# Patient Record
Sex: MALE | Race: WHITE | NOT HISPANIC OR LATINO | URBAN - METROPOLITAN AREA
[De-identification: names, ages, dates, MRNs, and addresses within clinical notes are randomized per-mention and may not be internally consistent; named-entity substitution may affect disease eponyms.]

---

## 2018-01-01 ENCOUNTER — INPATIENT (INPATIENT)
Facility: HOSPITAL | Age: 68
LOS: 1 days | End: 2018-10-13
Attending: INTERNAL MEDICINE | Admitting: INTERNAL MEDICINE
Payer: MEDICARE

## 2018-01-01 VITALS — TEMPERATURE: 98 F

## 2018-01-01 LAB
ABO RH CONFIRMATION: SIGNIFICANT CHANGE UP
ALBUMIN SERPL ELPH-MCNC: 2 G/DL — LOW (ref 3.5–5.2)
ALBUMIN SERPL ELPH-MCNC: 2.5 G/DL — LOW (ref 3.5–5.2)
ALBUMIN SERPL ELPH-MCNC: 2.6 G/DL — LOW (ref 3.5–5.2)
ALBUMIN SERPL ELPH-MCNC: 3.5 G/DL — SIGNIFICANT CHANGE UP (ref 3.5–5.2)
ALBUMIN SERPL ELPH-MCNC: 3.7 G/DL — SIGNIFICANT CHANGE UP (ref 3.5–5.2)
ALBUMIN SERPL ELPH-MCNC: 3.9 G/DL — SIGNIFICANT CHANGE UP (ref 3.5–5.2)
ALP SERPL-CCNC: 158 U/L — HIGH (ref 30–115)
ALP SERPL-CCNC: 186 U/L — HIGH (ref 30–115)
ALP SERPL-CCNC: 226 U/L — HIGH (ref 30–115)
ALP SERPL-CCNC: 79 U/L — SIGNIFICANT CHANGE UP (ref 30–115)
ALP SERPL-CCNC: 84 U/L — SIGNIFICANT CHANGE UP (ref 30–115)
ALP SERPL-CCNC: 84 U/L — SIGNIFICANT CHANGE UP (ref 30–115)
ALP SERPL-CCNC: 86 U/L — SIGNIFICANT CHANGE UP (ref 30–115)
ALP SERPL-CCNC: 86 U/L — SIGNIFICANT CHANGE UP (ref 30–115)
ALT FLD-CCNC: 1188 U/L — HIGH (ref 0–41)
ALT FLD-CCNC: 121 U/L — HIGH (ref 0–41)
ALT FLD-CCNC: 13 U/L — SIGNIFICANT CHANGE UP (ref 0–41)
ALT FLD-CCNC: 1646 U/L — HIGH (ref 0–41)
ALT FLD-CCNC: 2850 U/L — HIGH (ref 0–41)
ALT FLD-CCNC: 402 U/L — HIGH (ref 0–41)
ALT FLD-CCNC: 5967 U/L — HIGH (ref 0–41)
ALT FLD-CCNC: >7000 U/L — HIGH (ref 0–41)
AMYLASE P1 CFR SERPL: 378 U/L — CRITICAL HIGH (ref 25–115)
ANION GAP SERPL CALC-SCNC: 16 MMOL/L — HIGH (ref 7–14)
ANION GAP SERPL CALC-SCNC: 20 MMOL/L — HIGH (ref 7–14)
ANION GAP SERPL CALC-SCNC: 20 MMOL/L — HIGH (ref 7–14)
ANION GAP SERPL CALC-SCNC: 22 MMOL/L — HIGH (ref 7–14)
ANION GAP SERPL CALC-SCNC: 24 MMOL/L — HIGH (ref 7–14)
ANION GAP SERPL CALC-SCNC: 30 MMOL/L — HIGH (ref 7–14)
APPEARANCE UR: CLEAR — SIGNIFICANT CHANGE UP
APTT BLD: 159.9 SEC — CRITICAL HIGH (ref 27–39.2)
APTT BLD: 168.4 SEC — CRITICAL HIGH (ref 27–39.2)
APTT BLD: 25.2 SEC — LOW (ref 27–39.2)
APTT BLD: 27.9 SEC — SIGNIFICANT CHANGE UP (ref 27–39.2)
APTT BLD: 33.7 SEC — SIGNIFICANT CHANGE UP (ref 27–39.2)
APTT BLD: 34.6 SEC — SIGNIFICANT CHANGE UP (ref 27–39.2)
APTT BLD: 38 SEC — SIGNIFICANT CHANGE UP (ref 27–39.2)
APTT BLD: 39.4 SEC — HIGH (ref 27–39.2)
APTT BLD: 40 SEC — HIGH (ref 27–39.2)
APTT BLD: 82.6 SEC — CRITICAL HIGH (ref 27–39.2)
AST SERPL-CCNC: 1038 U/L — HIGH (ref 0–41)
AST SERPL-CCNC: 137 U/L — HIGH (ref 0–41)
AST SERPL-CCNC: 1409 U/L — HIGH (ref 0–41)
AST SERPL-CCNC: 3110 U/L — HIGH (ref 0–41)
AST SERPL-CCNC: 5 U/L — SIGNIFICANT CHANGE UP (ref 0–41)
AST SERPL-CCNC: 508 U/L — HIGH (ref 0–41)
AST SERPL-CCNC: 6307 U/L — HIGH (ref 0–41)
AST SERPL-CCNC: >7000 U/L — HIGH (ref 0–41)
BASE EXCESS BLDA CALC-SCNC: -16.8 MMOL/L — LOW (ref -2–2)
BASE EXCESS BLDA CALC-SCNC: -18.7 MMOL/L — LOW (ref -2–2)
BASE EXCESS BLDA CALC-SCNC: -19.4 MMOL/L — LOW (ref -2–2)
BASE EXCESS BLDA CALC-SCNC: -2.2 MMOL/L — LOW (ref -2–2)
BASE EXCESS BLDA CALC-SCNC: -3.2 MMOL/L — LOW (ref -2–2)
BASE EXCESS BLDA CALC-SCNC: -4.2 MMOL/L — LOW (ref -2–2)
BASE EXCESS BLDA CALC-SCNC: -4.2 MMOL/L — LOW (ref -2–2)
BASOPHILS # BLD AUTO: 0.01 K/UL — SIGNIFICANT CHANGE UP (ref 0–0.2)
BASOPHILS # BLD AUTO: 0.02 K/UL — SIGNIFICANT CHANGE UP (ref 0–0.2)
BASOPHILS # BLD AUTO: 0.02 K/UL — SIGNIFICANT CHANGE UP (ref 0–0.2)
BASOPHILS # BLD AUTO: 0.03 K/UL — SIGNIFICANT CHANGE UP (ref 0–0.2)
BASOPHILS # BLD AUTO: 0.03 K/UL — SIGNIFICANT CHANGE UP (ref 0–0.2)
BASOPHILS # BLD AUTO: 0.04 K/UL — SIGNIFICANT CHANGE UP (ref 0–0.2)
BASOPHILS # BLD AUTO: 0.04 K/UL — SIGNIFICANT CHANGE UP (ref 0–0.2)
BASOPHILS # BLD AUTO: 0.05 K/UL — SIGNIFICANT CHANGE UP (ref 0–0.2)
BASOPHILS # BLD AUTO: 0.06 K/UL — SIGNIFICANT CHANGE UP (ref 0–0.2)
BASOPHILS NFR BLD AUTO: 0.1 % — SIGNIFICANT CHANGE UP (ref 0–1)
BASOPHILS NFR BLD AUTO: 0.2 % — SIGNIFICANT CHANGE UP (ref 0–1)
BASOPHILS NFR BLD AUTO: 0.3 % — SIGNIFICANT CHANGE UP (ref 0–1)
BILIRUB DIRECT SERPL-MCNC: 0.9 MG/DL — HIGH (ref 0–0.2)
BILIRUB INDIRECT FLD-MCNC: 0.4 MG/DL — SIGNIFICANT CHANGE UP (ref 0.2–1.2)
BILIRUB SERPL-MCNC: 0.3 MG/DL — SIGNIFICANT CHANGE UP (ref 0.2–1.2)
BILIRUB SERPL-MCNC: 0.8 MG/DL — SIGNIFICANT CHANGE UP (ref 0.2–1.2)
BILIRUB SERPL-MCNC: 0.9 MG/DL — SIGNIFICANT CHANGE UP (ref 0.2–1.2)
BILIRUB SERPL-MCNC: 1 MG/DL — SIGNIFICANT CHANGE UP (ref 0.2–1.2)
BILIRUB SERPL-MCNC: 1.2 MG/DL — SIGNIFICANT CHANGE UP (ref 0.2–1.2)
BILIRUB SERPL-MCNC: 1.3 MG/DL — HIGH (ref 0.2–1.2)
BILIRUB SERPL-MCNC: 1.4 MG/DL — HIGH (ref 0.2–1.2)
BILIRUB UR-MCNC: NEGATIVE — SIGNIFICANT CHANGE UP
BLD GP AB SCN SERPL QL: SIGNIFICANT CHANGE UP
BUN SERPL-MCNC: 14 MG/DL — SIGNIFICANT CHANGE UP (ref 10–20)
BUN SERPL-MCNC: 22 MG/DL — HIGH (ref 10–20)
BUN SERPL-MCNC: 23 MG/DL — HIGH (ref 10–20)
BUN SERPL-MCNC: 27 MG/DL — HIGH (ref 10–20)
BUN SERPL-MCNC: 27 MG/DL — HIGH (ref 10–20)
BUN SERPL-MCNC: 29 MG/DL — HIGH (ref 10–20)
BUN SERPL-MCNC: 30 MG/DL — HIGH (ref 10–20)
BUN SERPL-MCNC: 31 MG/DL — HIGH (ref 10–20)
CALCIUM SERPL-MCNC: 6.5 MG/DL — LOW (ref 8.5–10.1)
CALCIUM SERPL-MCNC: 6.8 MG/DL — LOW (ref 8.5–10.1)
CALCIUM SERPL-MCNC: 7.3 MG/DL — LOW (ref 8.5–10.1)
CALCIUM SERPL-MCNC: 8.4 MG/DL — LOW (ref 8.5–10.1)
CALCIUM SERPL-MCNC: 8.7 MG/DL — SIGNIFICANT CHANGE UP (ref 8.5–10.1)
CALCIUM SERPL-MCNC: 8.8 MG/DL — SIGNIFICANT CHANGE UP (ref 8.5–10.1)
CALCIUM SERPL-MCNC: 8.9 MG/DL — SIGNIFICANT CHANGE UP (ref 8.5–10.1)
CALCIUM SERPL-MCNC: 9.8 MG/DL — SIGNIFICANT CHANGE UP (ref 8.5–10.1)
CHLORIDE SERPL-SCNC: 101 MMOL/L — SIGNIFICANT CHANGE UP (ref 98–110)
CHLORIDE SERPL-SCNC: 83 MMOL/L — LOW (ref 98–110)
CHLORIDE SERPL-SCNC: 91 MMOL/L — LOW (ref 98–110)
CHLORIDE SERPL-SCNC: 93 MMOL/L — LOW (ref 98–110)
CHLORIDE SERPL-SCNC: 93 MMOL/L — LOW (ref 98–110)
CHLORIDE SERPL-SCNC: 95 MMOL/L — LOW (ref 98–110)
CK MB CFR SERPL CALC: 45.1 NG/ML — HIGH (ref 0.6–6.3)
CK MB CFR SERPL CALC: 49.2 NG/ML — HIGH (ref 0.6–6.3)
CK MB CFR SERPL CALC: 51.8 NG/ML — HIGH (ref 0.6–6.3)
CK MB CFR SERPL CALC: 52.1 NG/ML — HIGH (ref 0.6–6.3)
CK MB CFR SERPL CALC: 61.3 NG/ML — HIGH (ref 0.6–6.3)
CK MB CFR SERPL CALC: 64.4 NG/ML — HIGH (ref 0.6–6.3)
CK SERPL-CCNC: 1304 U/L — HIGH (ref 0–225)
CK SERPL-CCNC: 1585 U/L — HIGH (ref 0–225)
CK SERPL-CCNC: 1657 U/L — HIGH (ref 0–225)
CK SERPL-CCNC: 1723 U/L — HIGH (ref 0–225)
CK SERPL-CCNC: 1872 U/L — HIGH (ref 0–225)
CK SERPL-CCNC: 2124 U/L — HIGH (ref 0–225)
CK SERPL-CCNC: 443 U/L — HIGH (ref 0–225)
CO2 SERPL-SCNC: 11 MMOL/L — LOW (ref 17–32)
CO2 SERPL-SCNC: 12 MMOL/L — LOW (ref 17–32)
CO2 SERPL-SCNC: 15 MMOL/L — LOW (ref 17–32)
CO2 SERPL-SCNC: 18 MMOL/L — SIGNIFICANT CHANGE UP (ref 17–32)
CO2 SERPL-SCNC: 19 MMOL/L — SIGNIFICANT CHANGE UP (ref 17–32)
CO2 SERPL-SCNC: 24 MMOL/L — SIGNIFICANT CHANGE UP (ref 17–32)
CO2 SERPL-SCNC: 25 MMOL/L — SIGNIFICANT CHANGE UP (ref 17–32)
CO2 SERPL-SCNC: 9 MMOL/L — CRITICAL LOW (ref 17–32)
COLOR SPEC: SIGNIFICANT CHANGE UP
CREAT SERPL-MCNC: 1.3 MG/DL — SIGNIFICANT CHANGE UP (ref 0.7–1.5)
CREAT SERPL-MCNC: 1.9 MG/DL — HIGH (ref 0.7–1.5)
CREAT SERPL-MCNC: 2.4 MG/DL — HIGH (ref 0.7–1.5)
CREAT SERPL-MCNC: 2.6 MG/DL — HIGH (ref 0.7–1.5)
CREAT SERPL-MCNC: 2.6 MG/DL — HIGH (ref 0.7–1.5)
CREAT SERPL-MCNC: 2.7 MG/DL — HIGH (ref 0.7–1.5)
CREAT SERPL-MCNC: 2.8 MG/DL — HIGH (ref 0.7–1.5)
CREAT SERPL-MCNC: 2.9 MG/DL — HIGH (ref 0.7–1.5)
CULTURE RESULTS: NO GROWTH — SIGNIFICANT CHANGE UP
D DIMER BLD IA.RAPID-MCNC: SIGNIFICANT CHANGE UP NG/ML DDU (ref 0–230)
D DIMER BLD IA.RAPID-MCNC: SIGNIFICANT CHANGE UP NG/ML DDU (ref 0–230)
DIFF PNL FLD: NEGATIVE — SIGNIFICANT CHANGE UP
EOSINOPHIL # BLD AUTO: 0 K/UL — SIGNIFICANT CHANGE UP (ref 0–0.7)
EOSINOPHIL # BLD AUTO: 0.01 K/UL — SIGNIFICANT CHANGE UP (ref 0–0.7)
EOSINOPHIL # BLD AUTO: 0.02 K/UL — SIGNIFICANT CHANGE UP (ref 0–0.7)
EOSINOPHIL NFR BLD AUTO: 0 % — SIGNIFICANT CHANGE UP (ref 0–8)
EOSINOPHIL NFR BLD AUTO: 0.1 % — SIGNIFICANT CHANGE UP (ref 0–8)
EOSINOPHIL NFR BLD AUTO: 0.2 % — SIGNIFICANT CHANGE UP (ref 0–8)
FIBRINOGEN PPP-MCNC: 230 MG/DL — SIGNIFICANT CHANGE UP (ref 204.4–570.6)
FIBRINOGEN PPP-MCNC: 266 MG/DL — SIGNIFICANT CHANGE UP (ref 204.4–570.6)
FIBRINOGEN PPP-MCNC: 283 MG/DL — SIGNIFICANT CHANGE UP (ref 204.4–570.6)
FIBRINOGEN PPP-MCNC: 336 MG/DL — SIGNIFICANT CHANGE UP (ref 204.4–570.6)
GAS PNL BLDA: SIGNIFICANT CHANGE UP
GLUCOSE BLDC GLUCOMTR-MCNC: 130 MG/DL — HIGH (ref 70–99)
GLUCOSE BLDC GLUCOMTR-MCNC: 166 MG/DL — HIGH (ref 70–99)
GLUCOSE BLDC GLUCOMTR-MCNC: 48 MG/DL — LOW (ref 70–99)
GLUCOSE BLDC GLUCOMTR-MCNC: 94 MG/DL — SIGNIFICANT CHANGE UP (ref 70–99)
GLUCOSE SERPL-MCNC: 111 MG/DL — HIGH (ref 70–99)
GLUCOSE SERPL-MCNC: 121 MG/DL — HIGH (ref 70–99)
GLUCOSE SERPL-MCNC: 128 MG/DL — HIGH (ref 70–99)
GLUCOSE SERPL-MCNC: 136 MG/DL — HIGH (ref 70–99)
GLUCOSE SERPL-MCNC: 286 MG/DL — HIGH (ref 70–99)
GLUCOSE SERPL-MCNC: 63 MG/DL — LOW (ref 70–99)
GLUCOSE SERPL-MCNC: 91 MG/DL — SIGNIFICANT CHANGE UP (ref 70–99)
GLUCOSE SERPL-MCNC: 99 MG/DL — SIGNIFICANT CHANGE UP (ref 70–99)
GLUCOSE UR QL: NEGATIVE — SIGNIFICANT CHANGE UP
HCO3 BLDA-SCNC: 10 MMOL/L — LOW (ref 23–27)
HCO3 BLDA-SCNC: 11 MMOL/L — LOW (ref 23–27)
HCO3 BLDA-SCNC: 11 MMOL/L — LOW (ref 23–27)
HCO3 BLDA-SCNC: 21 MMOL/L — LOW (ref 23–27)
HCO3 BLDA-SCNC: 22 MMOL/L — LOW (ref 23–27)
HCO3 BLDA-SCNC: 24 MMOL/L — SIGNIFICANT CHANGE UP (ref 23–27)
HCO3 BLDA-SCNC: 25 MMOL/L — SIGNIFICANT CHANGE UP (ref 23–27)
HCT VFR BLD CALC: 21.8 % — LOW (ref 42–52)
HCT VFR BLD CALC: 22.1 % — LOW (ref 42–52)
HCT VFR BLD CALC: 25.3 % — LOW (ref 42–52)
HCT VFR BLD CALC: 27.1 % — LOW (ref 42–52)
HCT VFR BLD CALC: 28.7 % — LOW (ref 42–52)
HCT VFR BLD CALC: 28.7 % — LOW (ref 42–52)
HCT VFR BLD CALC: 29.1 % — LOW (ref 42–52)
HCT VFR BLD CALC: 29.2 % — LOW (ref 42–52)
HCT VFR BLD CALC: 30.1 % — LOW (ref 42–52)
HCT VFR BLD CALC: 30.3 % — LOW (ref 42–52)
HCT VFR BLD CALC: 40.9 % — LOW (ref 42–52)
HGB BLD-MCNC: 10.3 G/DL — LOW (ref 14–18)
HGB BLD-MCNC: 10.4 G/DL — LOW (ref 14–18)
HGB BLD-MCNC: 14.2 G/DL — SIGNIFICANT CHANGE UP (ref 14–18)
HGB BLD-MCNC: 6.7 G/DL — CRITICAL LOW (ref 14–18)
HGB BLD-MCNC: 6.8 G/DL — CRITICAL LOW (ref 14–18)
HGB BLD-MCNC: 8.3 G/DL — LOW (ref 14–18)
HGB BLD-MCNC: 8.6 G/DL — LOW (ref 14–18)
HGB BLD-MCNC: 9.8 G/DL — LOW (ref 14–18)
HGB BLD-MCNC: 9.9 G/DL — LOW (ref 14–18)
HOROWITZ INDEX BLDA+IHG-RTO: 100 — SIGNIFICANT CHANGE UP
HOROWITZ INDEX BLDA+IHG-RTO: 40 — SIGNIFICANT CHANGE UP
HOROWITZ INDEX BLDA+IHG-RTO: 40 — SIGNIFICANT CHANGE UP
IMM GRANULOCYTES NFR BLD AUTO: 0.8 % — HIGH (ref 0.1–0.3)
IMM GRANULOCYTES NFR BLD AUTO: 1 % — HIGH (ref 0.1–0.3)
IMM GRANULOCYTES NFR BLD AUTO: 1 % — HIGH (ref 0.1–0.3)
IMM GRANULOCYTES NFR BLD AUTO: 1.3 % — HIGH (ref 0.1–0.3)
IMM GRANULOCYTES NFR BLD AUTO: 1.8 % — HIGH (ref 0.1–0.3)
IMM GRANULOCYTES NFR BLD AUTO: 2.2 % — HIGH (ref 0.1–0.3)
IMM GRANULOCYTES NFR BLD AUTO: 2.5 % — HIGH (ref 0.1–0.3)
IMM GRANULOCYTES NFR BLD AUTO: 4.3 % — HIGH (ref 0.1–0.3)
IMM GRANULOCYTES NFR BLD AUTO: 5.6 % — HIGH (ref 0.1–0.3)
INR BLD: 1.22 RATIO — SIGNIFICANT CHANGE UP (ref 0.65–1.3)
INR BLD: 1.6 RATIO — HIGH (ref 0.65–1.3)
INR BLD: 1.61 RATIO — HIGH (ref 0.65–1.3)
INR BLD: 1.84 RATIO — HIGH (ref 0.65–1.3)
INR BLD: 1.9 RATIO — HIGH (ref 0.65–1.3)
INR BLD: 1.91 RATIO — HIGH (ref 0.65–1.3)
INR BLD: 2.77 RATIO — HIGH (ref 0.65–1.3)
INR BLD: 3.27 RATIO — HIGH (ref 0.65–1.3)
INR BLD: 4.47 RATIO — HIGH (ref 0.65–1.3)
INR BLD: 5.47 RATIO — CRITICAL HIGH (ref 0.65–1.3)
KETONES UR-MCNC: NEGATIVE — SIGNIFICANT CHANGE UP
LACTATE SERPL-SCNC: 18.1 MMOL/L — CRITICAL HIGH (ref 0.5–2.2)
LACTATE SERPL-SCNC: 5.8 MMOL/L — CRITICAL HIGH (ref 0.5–2.2)
LACTATE SERPL-SCNC: 6.3 MMOL/L — CRITICAL HIGH (ref 0.5–2.2)
LEUKOCYTE ESTERASE UR-ACNC: ABNORMAL
LIDOCAIN IGE QN: 133 U/L — HIGH (ref 7–60)
LIDOCAIN IGE QN: 90 U/L — HIGH (ref 7–60)
LYMPHOCYTES # BLD AUTO: 0.77 K/UL — LOW (ref 1.2–3.4)
LYMPHOCYTES # BLD AUTO: 1.05 K/UL — LOW (ref 1.2–3.4)
LYMPHOCYTES # BLD AUTO: 1.26 K/UL — SIGNIFICANT CHANGE UP (ref 1.2–3.4)
LYMPHOCYTES # BLD AUTO: 1.41 K/UL — SIGNIFICANT CHANGE UP (ref 1.2–3.4)
LYMPHOCYTES # BLD AUTO: 1.45 K/UL — SIGNIFICANT CHANGE UP (ref 1.2–3.4)
LYMPHOCYTES # BLD AUTO: 1.49 K/UL — SIGNIFICANT CHANGE UP (ref 1.2–3.4)
LYMPHOCYTES # BLD AUTO: 1.51 K/UL — SIGNIFICANT CHANGE UP (ref 1.2–3.4)
LYMPHOCYTES # BLD AUTO: 1.55 K/UL — SIGNIFICANT CHANGE UP (ref 1.2–3.4)
LYMPHOCYTES # BLD AUTO: 1.59 K/UL — SIGNIFICANT CHANGE UP (ref 1.2–3.4)
LYMPHOCYTES # BLD AUTO: 13.7 % — LOW (ref 20.5–51.1)
LYMPHOCYTES # BLD AUTO: 13.8 % — LOW (ref 20.5–51.1)
LYMPHOCYTES # BLD AUTO: 2.4 % — LOW (ref 20.5–51.1)
LYMPHOCYTES # BLD AUTO: 3.2 % — LOW (ref 20.5–51.1)
LYMPHOCYTES # BLD AUTO: 5.7 % — LOW (ref 20.5–51.1)
LYMPHOCYTES # BLD AUTO: 5.9 % — LOW (ref 20.5–51.1)
LYMPHOCYTES # BLD AUTO: 5.9 % — LOW (ref 20.5–51.1)
LYMPHOCYTES # BLD AUTO: 8 % — LOW (ref 20.5–51.1)
LYMPHOCYTES # BLD AUTO: 9.7 % — LOW (ref 20.5–51.1)
MAGNESIUM SERPL-MCNC: 1.9 MG/DL — SIGNIFICANT CHANGE UP (ref 1.8–2.4)
MAGNESIUM SERPL-MCNC: 2 MG/DL — SIGNIFICANT CHANGE UP (ref 1.8–2.4)
MAGNESIUM SERPL-MCNC: 2.1 MG/DL — SIGNIFICANT CHANGE UP (ref 1.8–2.4)
MAGNESIUM SERPL-MCNC: 2.1 MG/DL — SIGNIFICANT CHANGE UP (ref 1.8–2.4)
MAGNESIUM SERPL-MCNC: 2.3 MG/DL — SIGNIFICANT CHANGE UP (ref 1.8–2.4)
MCHC RBC-ENTMCNC: 30.3 G/DL — LOW (ref 32–37)
MCHC RBC-ENTMCNC: 30.5 PG — SIGNIFICANT CHANGE UP (ref 27–31)
MCHC RBC-ENTMCNC: 31.1 PG — HIGH (ref 27–31)
MCHC RBC-ENTMCNC: 31.2 G/DL — LOW (ref 32–37)
MCHC RBC-ENTMCNC: 31.3 PG — HIGH (ref 27–31)
MCHC RBC-ENTMCNC: 31.6 PG — HIGH (ref 27–31)
MCHC RBC-ENTMCNC: 31.6 PG — HIGH (ref 27–31)
MCHC RBC-ENTMCNC: 31.7 G/DL — LOW (ref 32–37)
MCHC RBC-ENTMCNC: 31.7 PG — HIGH (ref 27–31)
MCHC RBC-ENTMCNC: 31.7 PG — HIGH (ref 27–31)
MCHC RBC-ENTMCNC: 31.8 PG — HIGH (ref 27–31)
MCHC RBC-ENTMCNC: 32.2 PG — HIGH (ref 27–31)
MCHC RBC-ENTMCNC: 32.4 PG — HIGH (ref 27–31)
MCHC RBC-ENTMCNC: 32.7 PG — HIGH (ref 27–31)
MCHC RBC-ENTMCNC: 32.8 G/DL — SIGNIFICANT CHANGE UP (ref 32–37)
MCHC RBC-ENTMCNC: 33.9 G/DL — SIGNIFICANT CHANGE UP (ref 32–37)
MCHC RBC-ENTMCNC: 34 G/DL — SIGNIFICANT CHANGE UP (ref 32–37)
MCHC RBC-ENTMCNC: 34.1 G/DL — SIGNIFICANT CHANGE UP (ref 32–37)
MCHC RBC-ENTMCNC: 34.2 G/DL — SIGNIFICANT CHANGE UP (ref 32–37)
MCHC RBC-ENTMCNC: 34.3 G/DL — SIGNIFICANT CHANGE UP (ref 32–37)
MCHC RBC-ENTMCNC: 34.5 G/DL — SIGNIFICANT CHANGE UP (ref 32–37)
MCHC RBC-ENTMCNC: 34.7 G/DL — SIGNIFICANT CHANGE UP (ref 32–37)
MCV RBC AUTO: 100.5 FL — HIGH (ref 80–94)
MCV RBC AUTO: 91.7 FL — SIGNIFICANT CHANGE UP (ref 80–94)
MCV RBC AUTO: 92.1 FL — SIGNIFICANT CHANGE UP (ref 80–94)
MCV RBC AUTO: 92.6 FL — SIGNIFICANT CHANGE UP (ref 80–94)
MCV RBC AUTO: 92.9 FL — SIGNIFICANT CHANGE UP (ref 80–94)
MCV RBC AUTO: 93.9 FL — SIGNIFICANT CHANGE UP (ref 80–94)
MCV RBC AUTO: 94.2 FL — HIGH (ref 80–94)
MCV RBC AUTO: 94.8 FL — HIGH (ref 80–94)
MCV RBC AUTO: 98.5 FL — HIGH (ref 80–94)
MCV RBC AUTO: 98.8 FL — HIGH (ref 80–94)
MCV RBC AUTO: 99.5 FL — HIGH (ref 80–94)
MONOCYTES # BLD AUTO: 0.35 K/UL — SIGNIFICANT CHANGE UP (ref 0.1–0.6)
MONOCYTES # BLD AUTO: 0.59 K/UL — SIGNIFICANT CHANGE UP (ref 0.1–0.6)
MONOCYTES # BLD AUTO: 0.7 K/UL — HIGH (ref 0.1–0.6)
MONOCYTES # BLD AUTO: 1.32 K/UL — HIGH (ref 0.1–0.6)
MONOCYTES # BLD AUTO: 1.58 K/UL — HIGH (ref 0.1–0.6)
MONOCYTES # BLD AUTO: 1.73 K/UL — HIGH (ref 0.1–0.6)
MONOCYTES # BLD AUTO: 1.82 K/UL — HIGH (ref 0.1–0.6)
MONOCYTES # BLD AUTO: 2.18 K/UL — HIGH (ref 0.1–0.6)
MONOCYTES # BLD AUTO: 2.44 K/UL — HIGH (ref 0.1–0.6)
MONOCYTES NFR BLD AUTO: 3.3 % — SIGNIFICANT CHANGE UP (ref 1.7–9.3)
MONOCYTES NFR BLD AUTO: 4.5 % — SIGNIFICANT CHANGE UP (ref 1.7–9.3)
MONOCYTES NFR BLD AUTO: 6.1 % — SIGNIFICANT CHANGE UP (ref 1.7–9.3)
MONOCYTES NFR BLD AUTO: 6.2 % — SIGNIFICANT CHANGE UP (ref 1.7–9.3)
MONOCYTES NFR BLD AUTO: 6.6 % — SIGNIFICANT CHANGE UP (ref 1.7–9.3)
MONOCYTES NFR BLD AUTO: 6.6 % — SIGNIFICANT CHANGE UP (ref 1.7–9.3)
MONOCYTES NFR BLD AUTO: 6.7 % — SIGNIFICANT CHANGE UP (ref 1.7–9.3)
MONOCYTES NFR BLD AUTO: 7.5 % — SIGNIFICANT CHANGE UP (ref 1.7–9.3)
MONOCYTES NFR BLD AUTO: 7.5 % — SIGNIFICANT CHANGE UP (ref 1.7–9.3)
MYOGLOBIN SERPL-MCNC: 2907 NG/ML — HIGH (ref 0–70)
NEUTROPHILS # BLD AUTO: 10.8 K/UL — HIGH (ref 1.4–6.5)
NEUTROPHILS # BLD AUTO: 14.52 K/UL — HIGH (ref 1.4–6.5)
NEUTROPHILS # BLD AUTO: 22.44 K/UL — HIGH (ref 1.4–6.5)
NEUTROPHILS # BLD AUTO: 22.67 K/UL — HIGH (ref 1.4–6.5)
NEUTROPHILS # BLD AUTO: 23.42 K/UL — HIGH (ref 1.4–6.5)
NEUTROPHILS # BLD AUTO: 28.61 K/UL — HIGH (ref 1.4–6.5)
NEUTROPHILS # BLD AUTO: 29.59 K/UL — HIGH (ref 1.4–6.5)
NEUTROPHILS # BLD AUTO: 8.18 K/UL — HIGH (ref 1.4–6.5)
NEUTROPHILS # BLD AUTO: 8.44 K/UL — HIGH (ref 1.4–6.5)
NEUTROPHILS NFR BLD AUTO: 74.3 % — SIGNIFICANT CHANGE UP (ref 42.2–75.2)
NEUTROPHILS NFR BLD AUTO: 78.1 % — HIGH (ref 42.2–75.2)
NEUTROPHILS NFR BLD AUTO: 82.1 % — HIGH (ref 42.2–75.2)
NEUTROPHILS NFR BLD AUTO: 83 % — HIGH (ref 42.2–75.2)
NEUTROPHILS NFR BLD AUTO: 86.2 % — HIGH (ref 42.2–75.2)
NEUTROPHILS NFR BLD AUTO: 86.3 % — HIGH (ref 42.2–75.2)
NEUTROPHILS NFR BLD AUTO: 87.1 % — HIGH (ref 42.2–75.2)
NEUTROPHILS NFR BLD AUTO: 88.2 % — HIGH (ref 42.2–75.2)
NEUTROPHILS NFR BLD AUTO: 89 % — HIGH (ref 42.2–75.2)
NITRITE UR-MCNC: NEGATIVE — SIGNIFICANT CHANGE UP
NRBC # BLD: 0 /100 WBCS — SIGNIFICANT CHANGE UP (ref 0–0)
NT-PROBNP SERPL-SCNC: 7501 PG/ML — HIGH (ref 0–300)
PCO2 BLDA: 35 MMHG — LOW (ref 38–42)
PCO2 BLDA: 37 MMHG — LOW (ref 38–42)
PCO2 BLDA: 37 MMHG — LOW (ref 38–42)
PCO2 BLDA: 38 MMHG — SIGNIFICANT CHANGE UP (ref 38–42)
PCO2 BLDA: 44 MMHG — HIGH (ref 38–42)
PCO2 BLDA: 51 MMHG — HIGH (ref 38–42)
PCO2 BLDA: 55 MMHG — HIGH (ref 38–42)
PH BLDA: 7.04 — CRITICAL LOW (ref 7.38–7.42)
PH BLDA: 7.06 — CRITICAL LOW (ref 7.38–7.42)
PH BLDA: 7.12 — CRITICAL LOW (ref 7.38–7.42)
PH BLDA: 7.27 — LOW (ref 7.38–7.42)
PH BLDA: 7.28 — LOW (ref 7.38–7.42)
PH BLDA: 7.31 — LOW (ref 7.38–7.42)
PH BLDA: 7.36 — LOW (ref 7.38–7.42)
PH UR: 8 — SIGNIFICANT CHANGE UP (ref 5–8)
PLATELET # BLD AUTO: 100 K/UL — LOW (ref 130–400)
PLATELET # BLD AUTO: 118 K/UL — LOW (ref 130–400)
PLATELET # BLD AUTO: 142 K/UL — SIGNIFICANT CHANGE UP (ref 130–400)
PLATELET # BLD AUTO: 152 K/UL — SIGNIFICANT CHANGE UP (ref 130–400)
PLATELET # BLD AUTO: 166 K/UL — SIGNIFICANT CHANGE UP (ref 130–400)
PLATELET # BLD AUTO: 171 K/UL — SIGNIFICANT CHANGE UP (ref 130–400)
PLATELET # BLD AUTO: 180 K/UL — SIGNIFICANT CHANGE UP (ref 130–400)
PLATELET # BLD AUTO: 180 K/UL — SIGNIFICANT CHANGE UP (ref 130–400)
PLATELET # BLD AUTO: 184 K/UL — SIGNIFICANT CHANGE UP (ref 130–400)
PLATELET # BLD AUTO: 187 K/UL — SIGNIFICANT CHANGE UP (ref 130–400)
PLATELET # BLD AUTO: 191 K/UL — SIGNIFICANT CHANGE UP (ref 130–400)
PO2 BLDA: 122 MMHG — HIGH (ref 78–95)
PO2 BLDA: 122 MMHG — HIGH (ref 78–95)
PO2 BLDA: 170 MMHG — HIGH (ref 78–95)
PO2 BLDA: 180 MMHG — HIGH (ref 78–95)
PO2 BLDA: 428 MMHG — HIGH (ref 78–95)
PO2 BLDA: 442 MMHG — HIGH (ref 78–95)
PO2 BLDA: 82 MMHG — SIGNIFICANT CHANGE UP (ref 78–95)
POTASSIUM SERPL-MCNC: 3 MMOL/L — LOW (ref 3.5–5)
POTASSIUM SERPL-MCNC: 4.6 MMOL/L — SIGNIFICANT CHANGE UP (ref 3.5–5)
POTASSIUM SERPL-MCNC: 5 MMOL/L — SIGNIFICANT CHANGE UP (ref 3.5–5)
POTASSIUM SERPL-MCNC: 5.4 MMOL/L — HIGH (ref 3.5–5)
POTASSIUM SERPL-MCNC: 5.5 MMOL/L — HIGH (ref 3.5–5)
POTASSIUM SERPL-MCNC: 5.7 MMOL/L — HIGH (ref 3.5–5)
POTASSIUM SERPL-MCNC: 6.1 MMOL/L — CRITICAL HIGH (ref 3.5–5)
POTASSIUM SERPL-MCNC: 6.3 MMOL/L — CRITICAL HIGH (ref 3.5–5)
POTASSIUM SERPL-SCNC: 3 MMOL/L — LOW (ref 3.5–5)
POTASSIUM SERPL-SCNC: 4.6 MMOL/L — SIGNIFICANT CHANGE UP (ref 3.5–5)
POTASSIUM SERPL-SCNC: 5 MMOL/L — SIGNIFICANT CHANGE UP (ref 3.5–5)
POTASSIUM SERPL-SCNC: 5.4 MMOL/L — HIGH (ref 3.5–5)
POTASSIUM SERPL-SCNC: 5.5 MMOL/L — HIGH (ref 3.5–5)
POTASSIUM SERPL-SCNC: 5.7 MMOL/L — HIGH (ref 3.5–5)
POTASSIUM SERPL-SCNC: 6.1 MMOL/L — CRITICAL HIGH (ref 3.5–5)
POTASSIUM SERPL-SCNC: 6.3 MMOL/L — CRITICAL HIGH (ref 3.5–5)
PROT SERPL-MCNC: 2.9 G/DL — LOW (ref 6–8)
PROT SERPL-MCNC: 3.8 G/DL — LOW (ref 6–8)
PROT SERPL-MCNC: 4.1 G/DL — LOW (ref 6–8)
PROT SERPL-MCNC: 5.2 G/DL — LOW (ref 6–8)
PROT SERPL-MCNC: 5.4 G/DL — LOW (ref 6–8)
PROT SERPL-MCNC: 5.6 G/DL — LOW (ref 6–8)
PROT SERPL-MCNC: 5.6 G/DL — LOW (ref 6–8)
PROT SERPL-MCNC: 6.1 G/DL — SIGNIFICANT CHANGE UP (ref 6–8)
PROT UR-MCNC: 30
PROTHROM AB SERPL-ACNC: 13.2 SEC — HIGH (ref 9.95–12.87)
PROTHROM AB SERPL-ACNC: 17.2 SEC — HIGH (ref 9.95–12.87)
PROTHROM AB SERPL-ACNC: 17.3 SEC — HIGH (ref 9.95–12.87)
PROTHROM AB SERPL-ACNC: 19.7 SEC — HIGH (ref 9.95–12.87)
PROTHROM AB SERPL-ACNC: 20.4 SEC — HIGH (ref 9.95–12.87)
PROTHROM AB SERPL-ACNC: 21.8 SEC — HIGH (ref 9.95–12.87)
PROTHROM AB SERPL-ACNC: 30.3 SEC — HIGH (ref 9.95–12.87)
PROTHROM AB SERPL-ACNC: 37.2 SEC — HIGH (ref 9.95–12.87)
PROTHROM AB SERPL-ACNC: >40 SEC — HIGH (ref 9.95–12.87)
PROTHROM AB SERPL-ACNC: >40 SEC — HIGH (ref 9.95–12.87)
RBC # BLD: 2.19 M/UL — LOW (ref 4.7–6.1)
RBC # BLD: 2.2 M/UL — LOW (ref 4.7–6.1)
RBC # BLD: 2.56 M/UL — LOW (ref 4.7–6.1)
RBC # BLD: 2.75 M/UL — LOW (ref 4.7–6.1)
RBC # BLD: 3.07 M/UL — LOW (ref 4.7–6.1)
RBC # BLD: 3.09 M/UL — LOW (ref 4.7–6.1)
RBC # BLD: 3.11 M/UL — LOW (ref 4.7–6.1)
RBC # BLD: 3.13 M/UL — LOW (ref 4.7–6.1)
RBC # BLD: 3.25 M/UL — LOW (ref 4.7–6.1)
RBC # BLD: 3.29 M/UL — LOW (ref 4.7–6.1)
RBC # BLD: 4.34 M/UL — LOW (ref 4.7–6.1)
RBC # FLD: 12.9 % — SIGNIFICANT CHANGE UP (ref 11.5–14.5)
RBC # FLD: 13.1 % — SIGNIFICANT CHANGE UP (ref 11.5–14.5)
RBC # FLD: 13.1 % — SIGNIFICANT CHANGE UP (ref 11.5–14.5)
RBC # FLD: 13.2 % — SIGNIFICANT CHANGE UP (ref 11.5–14.5)
RBC # FLD: 13.4 % — SIGNIFICANT CHANGE UP (ref 11.5–14.5)
RBC # FLD: 13.5 % — SIGNIFICANT CHANGE UP (ref 11.5–14.5)
RBC # FLD: 13.6 % — SIGNIFICANT CHANGE UP (ref 11.5–14.5)
RBC # FLD: 14.3 % — SIGNIFICANT CHANGE UP (ref 11.5–14.5)
RBC # FLD: 14.5 % — SIGNIFICANT CHANGE UP (ref 11.5–14.5)
SAO2 % BLDA: 100 % — HIGH (ref 94–98)
SAO2 % BLDA: 100 % — HIGH (ref 94–98)
SAO2 % BLDA: 97 % — SIGNIFICANT CHANGE UP (ref 94–98)
SAO2 % BLDA: 99 % — HIGH (ref 94–98)
SODIUM SERPL-SCNC: 128 MMOL/L — LOW (ref 135–146)
SODIUM SERPL-SCNC: 131 MMOL/L — LOW (ref 135–146)
SODIUM SERPL-SCNC: 132 MMOL/L — LOW (ref 135–146)
SODIUM SERPL-SCNC: 132 MMOL/L — LOW (ref 135–146)
SODIUM SERPL-SCNC: 135 MMOL/L — SIGNIFICANT CHANGE UP (ref 135–146)
SODIUM SERPL-SCNC: 136 MMOL/L — SIGNIFICANT CHANGE UP (ref 135–146)
SODIUM SERPL-SCNC: 137 MMOL/L — SIGNIFICANT CHANGE UP (ref 135–146)
SODIUM SERPL-SCNC: 140 MMOL/L — SIGNIFICANT CHANGE UP (ref 135–146)
SP GR SPEC: <=1.005 — SIGNIFICANT CHANGE UP (ref 1.01–1.03)
SPECIMEN SOURCE: SIGNIFICANT CHANGE UP
TROPONIN T SERPL-MCNC: 3.24 NG/ML — CRITICAL HIGH
TROPONIN T SERPL-MCNC: 3.79 NG/ML — CRITICAL HIGH
TROPONIN T SERPL-MCNC: 3.99 NG/ML — CRITICAL HIGH
TROPONIN T SERPL-MCNC: 4.13 NG/ML — CRITICAL HIGH
TROPONIN T SERPL-MCNC: 4.16 NG/ML — CRITICAL HIGH
TROPONIN T SERPL-MCNC: 5.34 NG/ML — CRITICAL HIGH
TROPONIN T SERPL-MCNC: 5.67 NG/ML — CRITICAL HIGH
TROPONIN T SERPL-MCNC: <0.01 NG/ML — SIGNIFICANT CHANGE UP
TYPE + AB SCN PNL BLD: SIGNIFICANT CHANGE UP
UROBILINOGEN FLD QL: 0.2 — SIGNIFICANT CHANGE UP (ref 0.2–0.2)
WBC # BLD: 10.48 K/UL — SIGNIFICANT CHANGE UP (ref 4.8–10.8)
WBC # BLD: 10.64 K/UL — SIGNIFICANT CHANGE UP (ref 4.8–10.8)
WBC # BLD: 11.35 K/UL — HIGH (ref 4.8–10.8)
WBC # BLD: 13.01 K/UL — HIGH (ref 4.8–10.8)
WBC # BLD: 17.66 K/UL — HIGH (ref 4.8–10.8)
WBC # BLD: 25.77 K/UL — HIGH (ref 4.8–10.8)
WBC # BLD: 26.3 K/UL — HIGH (ref 4.8–10.8)
WBC # BLD: 27.15 K/UL — HIGH (ref 4.8–10.8)
WBC # BLD: 30.04 K/UL — HIGH (ref 4.8–10.8)
WBC # BLD: 32.45 K/UL — HIGH (ref 4.8–10.8)
WBC # BLD: 33.21 K/UL — HIGH (ref 4.8–10.8)
WBC # FLD AUTO: 10.48 K/UL — SIGNIFICANT CHANGE UP (ref 4.8–10.8)
WBC # FLD AUTO: 10.64 K/UL — SIGNIFICANT CHANGE UP (ref 4.8–10.8)
WBC # FLD AUTO: 11.35 K/UL — HIGH (ref 4.8–10.8)
WBC # FLD AUTO: 13.01 K/UL — HIGH (ref 4.8–10.8)
WBC # FLD AUTO: 17.66 K/UL — HIGH (ref 4.8–10.8)
WBC # FLD AUTO: 25.77 K/UL — HIGH (ref 4.8–10.8)
WBC # FLD AUTO: 26.3 K/UL — HIGH (ref 4.8–10.8)
WBC # FLD AUTO: 27.15 K/UL — HIGH (ref 4.8–10.8)
WBC # FLD AUTO: 30.04 K/UL — HIGH (ref 4.8–10.8)
WBC # FLD AUTO: 32.45 K/UL — HIGH (ref 4.8–10.8)
WBC # FLD AUTO: 33.21 K/UL — HIGH (ref 4.8–10.8)

## 2018-01-01 PROCEDURE — 93970 EXTREMITY STUDY: CPT | Mod: 26

## 2018-01-01 RX ORDER — INSULIN HUMAN 100 [IU]/ML
10 INJECTION, SOLUTION SUBCUTANEOUS ONCE
Qty: 0 | Refills: 0 | Status: DISCONTINUED | OUTPATIENT
Start: 2018-01-01 | End: 2018-01-01

## 2018-01-01 RX ORDER — HEPARIN SODIUM 5000 [USP'U]/ML
4000 INJECTION INTRAVENOUS; SUBCUTANEOUS ONCE
Qty: 0 | Refills: 0 | Status: DISCONTINUED | OUTPATIENT
Start: 2018-01-01 | End: 2018-01-01

## 2018-01-01 RX ORDER — AMIODARONE HYDROCHLORIDE 400 MG/1
0.5 TABLET ORAL
Qty: 900 | Refills: 0 | Status: DISCONTINUED | OUTPATIENT
Start: 2018-01-01 | End: 2018-01-01

## 2018-01-01 RX ORDER — CLOPIDOGREL BISULFATE 75 MG/1
300 TABLET, FILM COATED ORAL ONCE
Qty: 0 | Refills: 0 | Status: COMPLETED | OUTPATIENT
Start: 2018-01-01 | End: 2018-01-01

## 2018-01-01 RX ORDER — CHLORHEXIDINE GLUCONATE 213 G/1000ML
1 SOLUTION TOPICAL
Qty: 0 | Refills: 0 | Status: DISCONTINUED | OUTPATIENT
Start: 2018-01-01 | End: 2018-01-01

## 2018-01-01 RX ORDER — ASPIRIN/CALCIUM CARB/MAGNESIUM 324 MG
81 TABLET ORAL DAILY
Qty: 0 | Refills: 0 | Status: DISCONTINUED | OUTPATIENT
Start: 2018-01-01 | End: 2018-01-01

## 2018-01-01 RX ORDER — INSULIN HUMAN 100 [IU]/ML
10 INJECTION, SOLUTION SUBCUTANEOUS ONCE
Qty: 0 | Refills: 0 | Status: COMPLETED | OUTPATIENT
Start: 2018-01-01 | End: 2018-01-01

## 2018-01-01 RX ORDER — VASOPRESSIN 20 [USP'U]/ML
0.04 INJECTION INTRAVENOUS
Qty: 100 | Refills: 0 | Status: DISCONTINUED | OUTPATIENT
Start: 2018-01-01 | End: 2018-01-01

## 2018-01-01 RX ORDER — NOREPINEPHRINE BITARTRATE/D5W 8 MG/250ML
0.05 PLASTIC BAG, INJECTION (ML) INTRAVENOUS
Qty: 8 | Refills: 0 | Status: DISCONTINUED | OUTPATIENT
Start: 2018-01-01 | End: 2018-01-01

## 2018-01-01 RX ORDER — POTASSIUM CHLORIDE 20 MEQ
40 PACKET (EA) ORAL EVERY 4 HOURS
Qty: 0 | Refills: 0 | Status: DISCONTINUED | OUTPATIENT
Start: 2018-01-01 | End: 2018-01-01

## 2018-01-01 RX ORDER — SODIUM CHLORIDE 9 MG/ML
500 INJECTION, SOLUTION INTRAVENOUS ONCE
Qty: 0 | Refills: 0 | Status: COMPLETED | OUTPATIENT
Start: 2018-01-01 | End: 2018-01-01

## 2018-01-01 RX ORDER — FENTANYL CITRATE 50 UG/ML
0.5 INJECTION INTRAVENOUS
Qty: 2500 | Refills: 0 | Status: DISCONTINUED | OUTPATIENT
Start: 2018-01-01 | End: 2018-01-01

## 2018-01-01 RX ORDER — SODIUM BICARBONATE 1 MEQ/ML
0.18 SYRINGE (ML) INTRAVENOUS
Qty: 150 | Refills: 0 | Status: DISCONTINUED | OUTPATIENT
Start: 2018-01-01 | End: 2018-01-01

## 2018-01-01 RX ORDER — FUROSEMIDE 40 MG
80 TABLET ORAL ONCE
Qty: 0 | Refills: 0 | Status: COMPLETED | OUTPATIENT
Start: 2018-01-01 | End: 2018-01-01

## 2018-01-01 RX ORDER — PHENYLEPHRINE HYDROCHLORIDE 10 MG/ML
0.1 INJECTION INTRAVENOUS
Qty: 160 | Refills: 0 | Status: DISCONTINUED | OUTPATIENT
Start: 2018-01-01 | End: 2018-01-01

## 2018-01-01 RX ORDER — DOPAMINE HYDROCHLORIDE 40 MG/ML
2.5 INJECTION, SOLUTION, CONCENTRATE INTRAVENOUS
Qty: 400 | Refills: 0 | Status: DISCONTINUED | OUTPATIENT
Start: 2018-01-01 | End: 2018-01-01

## 2018-01-01 RX ORDER — NOREPINEPHRINE BITARTRATE/D5W 8 MG/250ML
0.05 PLASTIC BAG, INJECTION (ML) INTRAVENOUS
Qty: 32 | Refills: 0 | Status: DISCONTINUED | OUTPATIENT
Start: 2018-01-01 | End: 2018-01-01

## 2018-01-01 RX ORDER — CEFEPIME 1 G/1
1000 INJECTION, POWDER, FOR SOLUTION INTRAMUSCULAR; INTRAVENOUS EVERY 24 HOURS
Qty: 0 | Refills: 0 | Status: DISCONTINUED | OUTPATIENT
Start: 2018-01-01 | End: 2018-01-01

## 2018-01-01 RX ORDER — CALCIUM GLUCONATE 100 MG/ML
2 VIAL (ML) INTRAVENOUS ONCE
Qty: 0 | Refills: 0 | Status: COMPLETED | OUTPATIENT
Start: 2018-01-01 | End: 2018-01-01

## 2018-01-01 RX ORDER — PANTOPRAZOLE SODIUM 20 MG/1
40 TABLET, DELAYED RELEASE ORAL DAILY
Qty: 0 | Refills: 0 | Status: DISCONTINUED | OUTPATIENT
Start: 2018-01-01 | End: 2018-01-01

## 2018-01-01 RX ORDER — DEXTROSE 50 % IN WATER 50 %
50 SYRINGE (ML) INTRAVENOUS ONCE
Qty: 0 | Refills: 0 | Status: COMPLETED | OUTPATIENT
Start: 2018-01-01 | End: 2018-01-01

## 2018-01-01 RX ORDER — SODIUM BICARBONATE 1 MEQ/ML
100 SYRINGE (ML) INTRAVENOUS
Qty: 0 | Refills: 0 | Status: DISCONTINUED | OUTPATIENT
Start: 2018-01-01 | End: 2018-01-01

## 2018-01-01 RX ORDER — CALCIUM GLUCONATE 100 MG/ML
1.5 VIAL (ML) INTRAVENOUS ONCE
Qty: 0 | Refills: 0 | Status: COMPLETED | OUTPATIENT
Start: 2018-01-01 | End: 2018-01-01

## 2018-01-01 RX ORDER — HEPARIN SODIUM 5000 [USP'U]/ML
INJECTION INTRAVENOUS; SUBCUTANEOUS
Qty: 25000 | Refills: 0 | Status: DISCONTINUED | OUTPATIENT
Start: 2018-01-01 | End: 2018-01-01

## 2018-01-01 RX ORDER — CLOPIDOGREL BISULFATE 75 MG/1
75 TABLET, FILM COATED ORAL DAILY
Qty: 0 | Refills: 0 | Status: DISCONTINUED | OUTPATIENT
Start: 2018-01-01 | End: 2018-01-01

## 2018-01-01 RX ORDER — AMIODARONE HYDROCHLORIDE 400 MG/1
1 TABLET ORAL
Qty: 900 | Refills: 0 | Status: DISCONTINUED | OUTPATIENT
Start: 2018-01-01 | End: 2018-01-01

## 2018-01-01 RX ORDER — HEPARIN SODIUM 5000 [USP'U]/ML
5000 INJECTION INTRAVENOUS; SUBCUTANEOUS EVERY 8 HOURS
Qty: 0 | Refills: 0 | Status: DISCONTINUED | OUTPATIENT
Start: 2018-01-01 | End: 2018-01-01

## 2018-01-01 RX ORDER — SODIUM BICARBONATE 1 MEQ/ML
100 SYRINGE (ML) INTRAVENOUS ONCE
Qty: 0 | Refills: 0 | Status: COMPLETED | OUTPATIENT
Start: 2018-01-01 | End: 2018-01-01

## 2018-01-01 RX ORDER — PROPOFOL 10 MG/ML
19.86 INJECTION, EMULSION INTRAVENOUS
Qty: 1000 | Refills: 0 | Status: DISCONTINUED | OUTPATIENT
Start: 2018-01-01 | End: 2018-01-01

## 2018-01-01 RX ORDER — HEPARIN SODIUM 5000 [USP'U]/ML
900 INJECTION INTRAVENOUS; SUBCUTANEOUS
Qty: 25000 | Refills: 0 | Status: DISCONTINUED | OUTPATIENT
Start: 2018-01-01 | End: 2018-01-01

## 2018-01-01 RX ORDER — VANCOMYCIN HCL 1 G
1000 VIAL (EA) INTRAVENOUS ONCE
Qty: 0 | Refills: 0 | Status: COMPLETED | OUTPATIENT
Start: 2018-01-01 | End: 2018-01-01

## 2018-01-01 RX ORDER — POTASSIUM CHLORIDE 20 MEQ
20 PACKET (EA) ORAL
Qty: 0 | Refills: 0 | Status: DISCONTINUED | OUTPATIENT
Start: 2018-01-01 | End: 2018-01-01

## 2018-01-01 RX ORDER — ASPIRIN/CALCIUM CARB/MAGNESIUM 324 MG
243 TABLET ORAL ONCE
Qty: 0 | Refills: 0 | Status: COMPLETED | OUTPATIENT
Start: 2018-01-01 | End: 2018-01-01

## 2018-01-01 RX ORDER — HEPARIN SODIUM 5000 [USP'U]/ML
600 INJECTION INTRAVENOUS; SUBCUTANEOUS
Qty: 25000 | Refills: 0 | Status: DISCONTINUED | OUTPATIENT
Start: 2018-01-01 | End: 2018-01-01

## 2018-01-01 RX ORDER — CHLORHEXIDINE GLUCONATE 213 G/1000ML
15 SOLUTION TOPICAL
Qty: 0 | Refills: 0 | Status: DISCONTINUED | OUTPATIENT
Start: 2018-01-01 | End: 2018-01-01

## 2018-01-01 RX ORDER — MIDAZOLAM HYDROCHLORIDE 1 MG/ML
0.02 INJECTION, SOLUTION INTRAMUSCULAR; INTRAVENOUS
Qty: 100 | Refills: 0 | Status: DISCONTINUED | OUTPATIENT
Start: 2018-01-01 | End: 2018-01-01

## 2018-01-01 RX ORDER — HEPARIN SODIUM 5000 [USP'U]/ML
4000 INJECTION INTRAVENOUS; SUBCUTANEOUS EVERY 6 HOURS
Qty: 0 | Refills: 0 | Status: DISCONTINUED | OUTPATIENT
Start: 2018-01-01 | End: 2018-01-01

## 2018-01-01 RX ORDER — SODIUM BICARBONATE 1 MEQ/ML
100 SYRINGE (ML) INTRAVENOUS ONCE
Qty: 0 | Refills: 0 | Status: DISCONTINUED | OUTPATIENT
Start: 2018-01-01 | End: 2018-01-01

## 2018-01-01 RX ORDER — POTASSIUM CHLORIDE 20 MEQ
20 PACKET (EA) ORAL
Qty: 0 | Refills: 0 | Status: COMPLETED | OUTPATIENT
Start: 2018-01-01 | End: 2018-01-01

## 2018-01-01 RX ADMIN — MIDAZOLAM HYDROCHLORIDE 1.68 MG/KG/HR: 1 INJECTION, SOLUTION INTRAMUSCULAR; INTRAVENOUS at 02:10

## 2018-01-01 RX ADMIN — CHLORHEXIDINE GLUCONATE 15 MILLILITER(S): 213 SOLUTION TOPICAL at 05:38

## 2018-01-01 RX ADMIN — FENTANYL CITRATE 4.2 MICROGRAM(S)/KG/HR: 50 INJECTION INTRAVENOUS at 10:55

## 2018-01-01 RX ADMIN — INSULIN HUMAN 10 UNIT(S): 100 INJECTION, SOLUTION SUBCUTANEOUS at 20:54

## 2018-01-01 RX ADMIN — VASOPRESSIN 2.4 UNIT(S)/MIN: 20 INJECTION INTRAVENOUS at 10:56

## 2018-01-01 RX ADMIN — INSULIN HUMAN 10 UNIT(S): 100 INJECTION, SOLUTION SUBCUTANEOUS at 22:39

## 2018-01-01 RX ADMIN — PANTOPRAZOLE SODIUM 40 MILLIGRAM(S): 20 TABLET, DELAYED RELEASE ORAL at 18:39

## 2018-01-01 RX ADMIN — Medication 7.87 MICROGRAM(S)/KG/MIN: at 02:30

## 2018-01-01 RX ADMIN — Medication 50 MILLILITER(S): at 20:54

## 2018-01-01 RX ADMIN — CLOPIDOGREL BISULFATE 75 MILLIGRAM(S): 75 TABLET, FILM COATED ORAL at 18:39

## 2018-01-01 RX ADMIN — Medication 50 MILLIEQUIVALENT(S): at 12:29

## 2018-01-01 RX ADMIN — SODIUM CHLORIDE 1000 MILLILITER(S): 9 INJECTION, SOLUTION INTRAVENOUS at 18:30

## 2018-01-01 RX ADMIN — Medication 50 MILLIEQUIVALENT(S): at 07:18

## 2018-01-01 RX ADMIN — Medication 100 MILLIEQUIVALENT(S): at 21:37

## 2018-01-01 RX ADMIN — Medication 3.93 MICROGRAM(S)/KG/MIN: at 10:54

## 2018-01-01 RX ADMIN — Medication 50 GRAM(S): at 21:51

## 2018-01-01 RX ADMIN — AMIODARONE HYDROCHLORIDE 33.33 MG/MIN: 400 TABLET ORAL at 00:55

## 2018-01-01 RX ADMIN — Medication 200 GRAM(S): at 22:33

## 2018-01-01 RX ADMIN — DOPAMINE HYDROCHLORIDE 7.87 MICROGRAM(S)/KG/MIN: 40 INJECTION, SOLUTION, CONCENTRATE INTRAVENOUS at 04:32

## 2018-01-01 RX ADMIN — Medication 50 MILLILITER(S): at 22:33

## 2018-01-01 RX ADMIN — FENTANYL CITRATE 4.2 MICROGRAM(S)/KG/HR: 50 INJECTION INTRAVENOUS at 04:32

## 2018-01-01 RX ADMIN — FENTANYL CITRATE 4.2 MICROGRAM(S)/KG/HR: 50 INJECTION INTRAVENOUS at 02:15

## 2018-01-01 RX ADMIN — Medication 3.93 MICROGRAM(S)/KG/MIN: at 04:31

## 2018-01-01 RX ADMIN — CHLORHEXIDINE GLUCONATE 15 MILLILITER(S): 213 SOLUTION TOPICAL at 07:36

## 2018-01-01 RX ADMIN — Medication 80 MILLIGRAM(S): at 18:56

## 2018-01-01 RX ADMIN — Medication 250 MILLIGRAM(S): at 17:37

## 2018-01-01 RX ADMIN — CHLORHEXIDINE GLUCONATE 1 APPLICATION(S): 213 SOLUTION TOPICAL at 05:08

## 2018-01-01 RX ADMIN — CHLORHEXIDINE GLUCONATE 1 APPLICATION(S): 213 SOLUTION TOPICAL at 05:05

## 2018-01-01 RX ADMIN — CEFEPIME 100 MILLIGRAM(S): 1 INJECTION, POWDER, FOR SOLUTION INTRAMUSCULAR; INTRAVENOUS at 17:37

## 2018-01-01 RX ADMIN — Medication 100 MILLIEQUIVALENT(S): at 14:00

## 2018-01-01 RX ADMIN — CLOPIDOGREL BISULFATE 300 MILLIGRAM(S): 75 TABLET, FILM COATED ORAL at 12:25

## 2018-01-01 RX ADMIN — PANTOPRAZOLE SODIUM 40 MILLIGRAM(S): 20 TABLET, DELAYED RELEASE ORAL at 12:28

## 2018-01-01 RX ADMIN — Medication 243 MILLIGRAM(S): at 12:28

## 2018-01-01 RX ADMIN — HEPARIN SODIUM 9 UNIT(S)/HR: 5000 INJECTION INTRAVENOUS; SUBCUTANEOUS at 04:32

## 2018-01-01 RX ADMIN — Medication 81 MILLIGRAM(S): at 18:39

## 2018-01-01 RX ADMIN — VASOPRESSIN 2.4 UNIT(S)/MIN: 20 INJECTION INTRAVENOUS at 04:32

## 2018-01-01 RX ADMIN — DOPAMINE HYDROCHLORIDE 7.87 MICROGRAM(S)/KG/MIN: 40 INJECTION, SOLUTION, CONCENTRATE INTRAVENOUS at 10:55

## 2018-01-01 RX ADMIN — CHLORHEXIDINE GLUCONATE 15 MILLILITER(S): 213 SOLUTION TOPICAL at 18:43

## 2018-01-01 RX ADMIN — Medication 50 MILLIEQUIVALENT(S): at 12:22

## 2018-01-01 RX ADMIN — HEPARIN SODIUM 5000 UNIT(S): 5000 INJECTION INTRAVENOUS; SUBCUTANEOUS at 14:50

## 2018-10-11 NOTE — CONSULT NOTE ADULT - ASSESSMENT
IMPRESSION:    Cardiac arrest - VFIB - witnessed   Acute respiratory failure  Pneumothorax s/p chest tube.   CAD    PLAN:    CNS: Start Fentanyl, Cont Versed. EEG. Repeat CTH in 24 hours, Targeted temperature monitoring.     HEENT: Oral care, Chlorhexidine     PULMONARY:  HOB @ 45 degrees.  Vent changes as follows: RR28/ 500/30/5. ABG in PM. Chest tube follow up with CTS.     CARDIOVASCULAR: Change Levophed to Dopamine, ECHO, A line placement, Duplex,  bolus, [place] HD monitoring, CE monitoring, EPS C/s, cardio consult. Cont Amio gtt. ASA 81.     GI: GI prophylaxis. OG tube feeds.     RENAL:  Follow up lytes.  Correct as needed, Monitor I/O     INFECTIOUS DISEASE: Follow up cultures. Pan culx     HEMATOLOGICAL:  DVT prophylaxis.    ENDOCRINE:  Follow up FS.  Insulin protocol if needed.    Full code   MICU monitoring   D/W family at the bedside.

## 2018-10-11 NOTE — CONSULT NOTE ADULT - ASSESSMENT
68 yr old male with PMH of chronic LBBB/ cardiomyopathy (diagnosed > 25 yrs ago, ? due to viral etiology), HTN, CAD s/p PCI of RCA 2 yrs ago  Cardiac arrest - VFIB - witnessed  Acute respiratory failure  Pneumothorax s/p chest tube.   Now w/ Liver and kidney failure  HyperK in setting of above    given hyperK w/ EKG chanes and severe FABIOLA and leidy will start CVVH (as is on several pressors)  2K, 2 L/hr replacment fluid keep Net even  Keep MAP > 65  Monitor UOP  monitor electrolytes phos on CVVH, replete as indicated   Prognosis is poor

## 2018-10-11 NOTE — ED PROVIDER NOTE - CARE PLAN
Principal Discharge DX:	Cardiorespiratory arrest  Secondary Diagnosis:	Hemothorax on right  Secondary Diagnosis:	Pneumothorax, right

## 2018-10-11 NOTE — H&P ADULT - NSHPSOCIALHISTORY_GEN_ALL_CORE
Fully independent at baseline. 30 pack year smoking history. quit 20 years ago. No alcohol or illicit drug use.

## 2018-10-11 NOTE — ED ADULT NURSE REASSESSMENT NOTE - NS ED NURSE REASSESS COMMENT FT1
Upon arrival to ED [place], pt intubated: Lip Line= 24, TV= 450, FiO2= 100%, RR= 20. Pt with Right femoral TLC, Left tibia IO, Right peripheral AC 18 gauge, Amiodarone Drip infusing.  Urinary catheter in place. Procedures done in [place] ED included CT Head/Chest, OG tube placement, Right sided chest tube (for hemo/pneumothorax seen on CT). While in [place] ED, pt placed on Norepinephrine, Fentanyl and Versed. Ventilator settings changed to RR=24, FiO2= 40%.

## 2018-10-11 NOTE — ED PROVIDER NOTE - CRITICAL CARE PROVIDED
consult w/ pt's family directly relating to pts condition/direct patient care (not related to procedure)/documentation/additional history taking/interpretation of diagnostic studies/consultation with other physicians

## 2018-10-11 NOTE — CONSULT NOTE ADULT - ASSESSMENT
-s/p cardiac arrest ,  ACS vs conduction abnormality as primary etiology.   currently on vasopressors, intubated  -right pneumothorax s/p chest tube insertion  -FABIOLA  -increased LFT's likely ischemic  -anemia r/o underlying bleeding      Plan:  CCU monitoring, arterial line to monitor BP  trend cardiac enzyme  2decho  stop amiodarone  if no evidence of bleeding and hb stable start antiplatelets   unable to start statin for now given increased LFTs  unable to start BB or ACE patient on pressors  Monitor kidney function LFTs and maintain electrolytes within normal ranges.   if patient is not awake in 24 hrs consider neuro evaluation  Eventually will need ischemic workup with cardiac cath if no significant brain damage   IF patient'; condition improve will need Biv ICD prior to discharge   Will continue to follow -s/p cardiac arrest ,  ACS vs high grade av block as evidenced by transient RBBB  EKG WHICH IS highly suggestive of  bilateral BBB .   currently on vasopressors, intubated  -right pneumothorax s/p chest tube insertion  -FABIOLA  -increased LFT's likely  SHOCK LIVER  -anemia r/o underlying bleeding      Plan:  CCU monitoring, arterial line to monitor BP  trend cardiac enzyme  2decho  stop amiodarone  if no evidence of bleeding and  HB  stable start antiplatelets   Monitor kidney function LFTs and maintain electrolytes within normal ranges.   if patient is not awake in 24 hrs consider neuro evaluation  Eventually will need ischemic workup with cardiac cath if no significant brain damage   IF patient'; condition improve will need Biv ICD prior to discharge   Will continue to follow

## 2018-10-11 NOTE — PROCEDURE NOTE - PROCEDURE
<<-----Click on this checkbox to enter Procedure Arterial cannulation  10/11/2018    Active  [id number]

## 2018-10-11 NOTE — ED PROVIDER NOTE - PROGRESS NOTE DETAILS
Transfer uneventful, patient intubated. ICU fellow consulted Cardio fellow aware ICU Fellow Dr. Andres Hamm approved patient Trauma team consulted for a traumatic pneumohemothorax. As per Dr. Laurent, CTX should be consulted because pt's injuries were iatrogenic Chest tube will be placed Chest tube will be placed on right with output of air and about 200 ccblood I received signout from Dr. Celestin Pt evaluated by me upon arrival. Intubated s/p cardiac arrest, ACLS. Pt with pulse, HR in 70s, NSR. On exam, pt undersedated, + circular ecchymotic region on mid sternum, likely from MARION device used during code. + flail chest appreciable. Plan is head CT and CT chest and manage accordingly.

## 2018-10-11 NOTE — H&P ADULT - ASSESSMENT
69 y/o M w/ PMH of LBBB, viral cardiomyopathy, CAD w/ 2 stents on plavix, presented to the [facility] site after being found unresponsive    1.) Cardiopulmonary arrest secondary to cardiac arrhythmia:    - Admit to ICU    - Continue mechanical ventilation    - Monitor telemetry.    - f/u CBC, CMP    - Continue sedation    2.) Traumatic pneumohemothorax: secondary to chest compressions during CPR    - Chest tube in place    - CT surgery is following    3.) Family states they gave a list of medications to the ED staff and the list was never returned. No medications were entered into the system. They are not sure what medications the patient was taking at home. They will go home and collect his medications and return later. Unable to complete the patient's past medical history or medication reconciliation at this time.    4.) GI / DVT PPx: protonix / heparin    5.) Disposition:    - Full Code. Had a goals of care discussion with the family, they would like to continue to have everything done at this time.

## 2018-10-11 NOTE — ED PROVIDER NOTE - OBJECTIVE STATEMENT
69y/o M w/ pmhx lbbb, viral cardiomyopathy, 2 stents in the past on plavix is BIBA by ems after being found unresponsive about an hour before presentation. pt on cardiac arrest. was found on afib.  intubated in the field.  6 epis given, Ca, Mg bicarb and amiodarone given.    pt here was given lidocaine, shocked double sequential obtained rosc after an hour. no pericardial effusion in bedside ultrasound, no signs of pneumo.  r. femoral line placed.

## 2018-10-11 NOTE — CONSULT NOTE ADULT - ASSESSMENT
68 yr old male with PMH of chronic LBBB/ cardiomyopathy (diagnosed > 25 yrs ago, ? due to viral etiology), HTN, CAD s/p PCI of RCA 2 yrs ago (at [facility], cardiologist Dr. Kari Bustos) presented to [facility] last night with cardiac arrest.     Impression:  Cardiac arrest/ V. fib s/p resuscitation and intubation  H/o LBBB/ cardiomyopathy/ CAD s/p PCI    Plan:  Obtain records from pt's cardiologist office ( Dr. Kari Bustos at [facility])  Cont Amiodarone infusion  check TTE  Post cardiac arrest care as per CCM 68 yr old male with PMH of chronic LBBB/ cardiomyopathy (diagnosed > 25 yrs ago, ? due to viral etiology), HTN, CAD s/p PCI of RCA 2 yrs ago (at [facility], cardiologist Dr. Kari Bustos) presented to [facility] last night with cardiac arrest.     Impression:  Cardiac arrest/ V. fib s/p resuscitation and intubation  H/o LBBB/ cardiomyopathy/ CAD s/p PCI    We noted PCI was in RCA na d dcm is old and non ischemic by hx.   We also noted trop of 4.    We will review echo.   Will support for now with pressors and treat with heparin and as and plavix.   We disucssed will family he he awkes will cath to determie if this was ischemic or electrical with hx of EF 33% and severe bradycardia as well.   eval and supplement lytes.    Plan:  Obtain records from pt's cardiologist office ( Dr. Kari Bustos at [facility])  Cont Amiodarone infusion  check TTE  Post cardiac arrest care as per CCM

## 2018-10-11 NOTE — H&P ADULT - HISTORY OF PRESENT ILLNESS
69 y/o M w/ PMH of LBBB, viral cardiomyopathy, CAD w/ 2 stents on plavix, presented to the [facility] site after being found unresponsive.     is BIBA by ems after being found unresponsive about an hour before presentation. pt on cardiac arrest. was found on afib.  intubated in the field.  6 epis given, Ca, Mg bicarb and amiodarone given.    	pt here was given lidocaine, shocked double sequential obtained rosc after an hour. no pericardial effusion in bedside ultrasound, no signs of pneumo.  r. femoral line placed.    Trauma team consulted for a traumatic pneumohemothorax. As per Dr. Laurent, CTX should be consulted because pt's injuries were iatrogenic. 67 y/o M w/ PMH of LBBB, viral cardiomyopathy, CAD w/ 2 stents on plavix, presented to the [facility] site after being found unresponsive. Patient was at home with family, when the family members heard a "thump." They immediately ran over and found the patient unresponsive and pulseless. CPR was started immediately (one family member is a nurse). EMS arrived and took over CPR and intubated the patient in the field. Patient was in V-fib. Six epinephrine, 100mg lidocaine, and bicarbonate was given during the code. ED reports 2 shocks given. After approximately 1 hour of resuscitation, ROSC was achieved. Patient was transported to the [facility]. STEMI code was called and patient was transported to the [place] site. At the [place] site, cardiology canceled the STEMI code, but the patient remained intubated and sedated. He is admitted to the ICU for further monitoring and management.     In the [place] ED, patient was found to have a traumatic R. pneumohemothorax. CT surgery was consulted and placed a chest tube.     Of note, the patient was scheduled for pacemaker placement at [facility] today (10/11/18). Family states the pacemaker was being placed for a chronic left bundle branch block.

## 2018-10-11 NOTE — CONSULT NOTE ADULT - SUBJECTIVE AND OBJECTIVE BOX
NEPHROLOGY CONSULTATION NOTE    68 yr old male with PMH of chronic LBBB/ cardiomyopathy (diagnosed > 25 yrs ago, ? due to viral etiology), HTN, CAD s/p PCI of RCA 2 yrs ago (at [facility], cardiologist Dr. Kari Bustos) presented to [facility] 10/10 with cardiac arrest. History obtained from record, confirmed ww/ children   In the ED pt was in refractory Vfib.  after approximately 1 hr of resuscitation (including defibrillations) ROSC was achieved. Pt was intubated but was unresponsive. STEMI code was called around 12:43 am. After discussing with interventional cardiologist on call and ED attending, STEMI code was canceled. Pt was then transferred to [facility] for further management.   In the [place] ED, patient was found to have a R. pneumothorax and a chest tube was placed.   pt had cardiomyopathy with EF 33% for many years and was scheduled to get a PPM today at [facility]. At baseline, pt is functionally active and didnot c/o any exertional chest pain/ dyspnea recently.     Over the course of the day devloped worsening LFt's risign Cr and anuria  K 6.1 w/ peaked T waves   no UOP s/p lasix      PAST MEDICAL & SURGICAL HISTORY:    Allergies:  No Known Allergies    Home Medications Reviewed  Hospital Medications:   MEDICATIONS  (STANDING):  DOPamine Infusion 2.5 MICROgram(s)/kG/Min (7.867 mL/Hr) IV Continuous <Continuous>  fentaNYL   Infusion. 0.5 MICROgram(s)/kG/Hr (4.196 mL/Hr) IV Continuous <Continuous>  heparin  Infusion.  Unit(s)/Hr (10 mL/Hr) IV Continuous <Continuous>  norepinephrine Infusion 0.05 MICROgram(s)/kG/Min (3.934 mL/Hr) IV Continuous <Continuous>  norepinephrine Infusion 0.05 MICROgram(s)/kG/Min (7.867 mL/Hr) IV Continuous <Continuous>  pantoprazole   Suspension 40 milliGRAM(s) Enteral Tube daily  PureFlow Dialysate RFP-400 (K 2 / Ca 3) 5000 milliLiter(s) (2000 mL/Hr) CRRT <Continuous>  vasopressin Infusion 0.04 Unit(s)/Min (2.4 mL/Hr) IV Continuous <Continuous>      SOCIAL HISTORY:  Denies ETOH,Smoking,   FAMILY HISTORY:        REVIEW OF SYSTEMS:  unable to obtain    VITALS:  T(F): 100.5 (10-11-18 @ 20:00), Max: 100.5 (10-11-18 @ 20:00)  HR: 80 (10-11-18 @ 21:15)  BP: 118/70 (10-11-18 @ 11:15)  RR: 28 (10-11-18 @ 21:15)  SpO2: 98% (10-11-18 @ 21:15)    10-10 @ 07:01  -  10-11 @ 07:00  --------------------------------------------------------  IN: 573.8 mL / OUT: 155 mL / NET: 418.8 mL    10-11 @ 07:01  -  10-11 @ 22:19  --------------------------------------------------------  IN: 1770.3 mL / OUT: 345 mL / NET: 1425.3 mL      Height (cm): 182.88 (10-11 @ 05:55)  Weight (kg): 83.915 (10-11 @ 02:02)  BMI (kg/m2): 25.1 (10-11 @ 05:55)  BSA (m2): 2.06 (10-11 @ 05:55)    10-11-18 @ 07:01  -  10-11-18 @ 22:19  --------------------------------------------------------  IN: 0 mL / OUT: 45 mL / NET: -45 mL      I&O's Detail    10 Oct 2018 07:01  -  11 Oct 2018 07:00  --------------------------------------------------------  IN:    amiodarone Infusion: 133.2 mL    norepinephrine Infusion: 440.6 mL  Total IN: 573.8 mL    OUT:    Chest Tube: 120 mL    Voided: 35 mL  Total OUT: 155 mL    Total NET: 418.8 mL      11 Oct 2018 07:01  -  11 Oct 2018 22:19  --------------------------------------------------------  IN:    amiodarone Infusion: 16.7 mL    DOPamine Infusion: 184.8 mL    fentaNYL Infusion.: 51.6 mL    heparin  Infusion.: 60 mL    Lactated Ringers IV Bolus: 500 mL    norepinephrine Infusion: 703.7 mL    norepinephrine Infusion: 239.1 mL    vasopressin Infusion: 14.4 mL  Total IN: 1770.3 mL    OUT:    Chest Tube: 300 mL    Indwelling Catheter - Urethral: 45 mL  Total OUT: 345 mL    Total NET: 1425.3 mL        Creatine Kinase, Serum: 1657 U/L (10-11-18 @ 17:30)  Creatine Kinase, Serum: 1585 U/L (10-11-18 @ 16:45)  Creatine Kinase, Serum: 1304 U/L (10-11-18 @ 10:35)  Creatine Kinase, Serum: 443 U/L (10-11-18 @ 00:35)      PHYSICAL EXAM:  General Appearance: intubated   Cardiovascular: Normal S1 S2, No JVD, No murmurs,   Respiratory: decrease bilateral airways entries   Gastrointestinal:  Soft, Non-tender  Extremities: no REJI   Lt Fem U dall (10/11)    LABS:  10-11    131<L>  |  91<L>  |  30<H>  ----------------------------<  111<H>  6.1<HH>   |  18  |  2.6<H>    Ca    8.9      11 Oct 2018 17:30  Mg     2.0     10-11    TPro  5.6<L>  /  Alb  3.5  /  TBili  0.9  /  DBili      /  AST  1409<H>  /  ALT  1646<H>  /  AlkPhos  86  10-11    Creatinine Trend: 2.6 <--, 2.4 <--, 1.9 <--, 1.3 <--                        10.4   30.04 )-----------( 191      ( 11 Oct 2018 17:30 )             30.3     Urine Studies:  Urinalysis Basic - ( 11 Oct 2018 09:33 )    Color: Straw / Appearance: Clear / SG: <=1.005 / pH:   Gluc:  / Ketone: Negative  / Bili: Negative / Urobili: 0.2   Blood:  / Protein: 30 / Nitrite: Negative   Leuk Esterase: Small / RBC:  / WBC    Sq Epi:  / Non Sq Epi:  / Bacteria:                 RADIOLOGY & ADDITIONAL STUDIES:

## 2018-10-11 NOTE — ED PROVIDER NOTE - PHYSICAL EXAMINATION
VITAL SIGNS: I have reviewed nursing notes and confirm.  CONSTITUTIONAL: unresponsive gcs 3  SKIN: skin exam is warm and dry, no acute rash.   HEAD: Normocephalic; atraumatic.  EYES:  eyes rolled. PERRLA conjunctiva and sclera clear.  ENT: No nasal discharge; intubatedl.    NECK: Supple; non tender.  CARD: afib  RESP: No wheezes, rales or rhonchi.   ABD: Normal bowel sounds; soft; non-distended; \  EXT: Normal ROM. No  cyanosis or edema.  NEURO: gcs 3

## 2018-10-11 NOTE — CONSULT NOTE ADULT - SUBJECTIVE AND OBJECTIVE BOX
HISTORY OF PRESENT ILLNESS:   68 yr old male with PMH of chronic LBBB/ cardiomyopathy (diagnosed > 25 yrs ago, ? due to viral etiology), HTN, CAD s/p PCI of RCA 2 yrs ago (at [facility], cardiologist Dr. Kari Bustos) presented to [facility] last night with cardiac arrest. History was provided by pt's wife. Last night pt's wife heard a "thump" and found pt on the floor unresponsive and pulseless, CPR was initiated and EMS brought pt to [facility] ED. In the ED pt was in refractory Vfib. As per ED attending, after approximately 1 hr of resuscitation (including defibrillations) ROSC was achieved. Pt was intubated but was unresponsive. STEMI code was called around 12:43 am. After discussing with interventional cardiologist on call and ED attending, STEMI code was canceled. Pt was then transferred to [facility] for further management.   In the [place] ED, patient was found to have a R. pneumothorax and a chest tube was placed.   As per pt's wife, pt had cardiomyopathy with EF 33% for many years and was scheduled to get a PPM today at [facility]. At baseline, pt is functionally active and didnot c/o any exertional chest pain/ dyspnea recently.     Patient seen at bedside, intubated, off sedation currently respond to painful stimuli, initially hypotensive on dopamine and tele showed sinus bradycardia in 50's   stat lab drawn, started on levophed, amiodarone stopped, with improvement of HR and BP, bedside limited echo showed no pericardial effusion with EF ~ 35%      PAST MEDICAL & SURGICAL HISTORY:  as above  FAMILY HISTORY:    Allergies    No Known Allergies    Intolerances    	  Home Medications:    MEDICATIONS  (STANDING):  amiodarone Infusion 0.5 mG/Min (16.667 mL/Hr) IV Continuous <Continuous>  DOPamine Infusion 2.5 MICROgram(s)/kG/Min (7.867 mL/Hr) IV Continuous <Continuous>  fentaNYL   Infusion. 0.5 MICROgram(s)/kG/Hr (4.196 mL/Hr) IV Continuous <Continuous>  heparin  Injectable 5000 Unit(s) SubCutaneous every 8 hours  lactated ringers Bolus 500 milliLiter(s) IV Bolus once  midazolam Infusion 0.02 mG/kG/Hr (1.678 mL/Hr) IV Continuous <Continuous>  norepinephrine Infusion 0.05 MICROgram(s)/kG/Min (7.867 mL/Hr) IV Continuous <Continuous>  pantoprazole   Suspension 40 milliGRAM(s) Enteral Tube daily        PHYSICAL EXAM:  T(C): 36.3 (10-11-18 @ 05:55), Max: 36.9 (10-11-18 @ 00:47)  HR: 52 (10-11-18 @ 09:40) (52 - 84)  BP: 88/71 (10-11-18 @ 09:40) (61/41 - 150/96)  RR: 27 (10-11-18 @ 09:40) (0 - 34)  SpO2: 100% (10-11-18 @ 09:40) (97% - 100%)  Wt(kg): --  I&O's Summary    10 Oct 2018 07:01  -  11 Oct 2018 07:00  --------------------------------------------------------  IN: 573.8 mL / OUT: 155 mL / NET: 418.8 mL    11 Oct 2018 07:01  -  11 Oct 2018 13:03  --------------------------------------------------------  IN: 603.2 mL / OUT: 150 mL / NET: 453.2 mL      Daily Height in cm: 182.88 (11 Oct 2018 05:55)    Daily     General Appearance: intubated   Cardiovascular: Normal S1 S2, No JVD, No murmurs,   Respiratory: decrease bilateral airways entries   Gastrointestinal:  Soft, Non-tender  Extremities: no REJI        LABS:	 	                        9.9    32.45 )-----------( 171      ( 11 Oct 2018 10:35 )             29.1     10-11    135  |  95<L>  |  22<H>  ----------------------------<  99  5.7<H>   |  24  |  1.9<H>  10-11    128<L>  |  83<L>  |  14  ----------------------------<  286<H>  3.0<L>   |  25  |  1.3    Ca    8.7      11 Oct 2018 10:35  Ca    9.8      11 Oct 2018 00:35  Mg     2.1     10-11  Mg     2.3     10-11    TPro  5.2<L>  /  Alb  3.5  /  TBili  1.3<H>  /  DBili  x   /  AST  508<H>  /  ALT  402<H>  /  AlkPhos  84  10-11  TPro  6.1  /  Alb  3.9  /  TBili  0.3  /  DBili  x   /  AST  137<H>  /  ALT  121<H>  /  AlkPhos  79  10-11      CARDIAC MARKERS:  Troponin T, Serum: 4.16 ng/mL (10-11 @ 10:35)  Troponin T, Serum: <0.01 ng/mL (10-11 @ 00:35)            TELEMETRY EVENTS: 	    ECG:  	< from: 12 Lead ECG (10.11.18 @ 00:40) >  Sinus rhythm with 1st degree A-V block  Left ventricular hypertrophy with QRS widening and repolarization abnormality  Cannot rule out Anteroseptal infarct , age undetermined    < end of copied text >    < from: 12 Lead ECG (10.11.18 @ 08:12) >   Normal sinus rhythm  Left axis deviation  Left bundle branch block    < end of copied text >    RADIOLOGY:  < from: Xray Chest 1 View- PORTABLE-Urgent (10.11.18 @ 07:52) >    Impression:      No radiographic evidence of acute cardiopulmonary disease.    < end of copied text >  	    PREVIOUS DIAGNOSTIC TESTING:    [ ] Echocardiogram:  pending

## 2018-10-11 NOTE — ED PROCEDURE NOTE - CPROC ED INFUS LINE DETAIL1
The guidewire was recovered./The catheter was placed using sterile technique./All lumen(s) aspirated and flushed without difficulty./The location was identified, and the area was draped and prepped./Ultrasound guidance was used during placement.

## 2018-10-11 NOTE — CONSULT NOTE ADULT - SUBJECTIVE AND OBJECTIVE BOX
Patient is a 68y old  Male who presents with a chief complaint of cardiopulmonary arrest (11 Oct 2018 05:09)    HPI:  67 y/o M w/ PMH of LBBB, viral cardiomyopathy, CAD w/ 2 stents on plavix, presented to the [facility] site after being found unresponsive. Patient was at home with family, when the family members heard a "thump." They immediately ran over and found the patient unresponsive and pulseless. CPR was started immediately (one family member is a nurse). EMS arrived and took over CPR and intubated the patient in the field. Patient was in V-fib. Six epinephrine, 100mg lidocaine, and bicarbonate was given during the code. ED reports 2 shocks given. After approximately 1 hour of resuscitation, ROSC was achieved. Patient was transported to the [facility]. STEMI code was called and patient was transported to the [place] site. At the [place] site, cardiology canceled the STEMI code, but the patient remained intubated and sedated. He is admitted to the ICU for further monitoring and management.     In the [place] ED, patient was found to have a traumatic R. pneumothorax. CT surgery was consulted and placed a chest tube.     Of note, the patient was scheduled for pacemaker placement at [facility] today (10/11/18). Family states the pacemaker was being placed for a chronic left bundle branch block. (11 Oct 2018 05:09)      ROS:  unable to obtain. pt is intubated.     PAST MEDICAL & SURGICAL HISTORY      FAMILY HISTORY:  FAMILY HISTORY:      SOCIAL HISTORY:  [-]smoker  [-]Alcohol  [-]Drug    ALLERGIES:  No Known Allergies      MEDICATIONS:  MEDICATIONS  (STANDING):  amiodarone Infusion 0.5 mG/Min (16.667 mL/Hr) IV Continuous <Continuous>  fentaNYL   Infusion 0.5 MICROgram(s)/kG/Hr (4.196 mL/Hr) IV Continuous <Continuous>  heparin  Injectable 5000 Unit(s) SubCutaneous every 8 hours  midazolam Infusion 0.02 mG/kG/Hr (1.678 mL/Hr) IV Continuous <Continuous>  norepinephrine Infusion 0.05 MICROgram(s)/kG/Min (7.867 mL/Hr) IV Continuous <Continuous>  pantoprazole   Suspension 40 milliGRAM(s) Enteral Tube daily  potassium chloride  20 mEq/100 mL IVPB 20 milliEquivalent(s) IV Intermittent every 2 hours    MEDICATIONS  (PRN):      HOME MEDICATIONS:  Home Medications:      VITALS:   T(F): 97.3 (10-11 @ 05:55), Max: 98.5 (10-11 @ 00:47)  HR: 56 (10-11 @ 07:00) (56 - 84)  BP: 99/74 (10-11 @ 06:45) (61/41 - 150/96)  BP(mean): 48 (10-11 @ 07:00) (48 - 92)  RR: 23 (10-11 @ 07:00) (0 - 24)  SpO2: 100% (10-11 @ 07:00) (97% - 100%)    I&O's Summary    10 Oct 2018 07:01  -  11 Oct 2018 07:00  --------------------------------------------------------  IN: 169.9 mL / OUT: 155 mL / NET: 14.9 mL        PHYSICAL EXAM:  GEN: intubated  HEENT: no pallor  NECK: Supple, no JVD  LUNGS: b/l air entry  CARDIOVASCULAR: S1/S2 regular, no murmurs or rubs  ABD: Soft, BS+  EXT: No Lower extremity edema/cyanosis  NEURO: intubated, sedated   SKIN: Intact    LABS:                        14.2   10.64 )-----------( 152      ( 11 Oct 2018 00:35 )             40.9     10-11    128<L>  |  83<L>  |  14  ----------------------------<  286<H>  3.0<L>   |  25  |  1.3    Ca    9.8      11 Oct 2018 00:35  Mg     2.3     10-11    TPro  6.1  /  Alb  3.9  /  TBili  0.3  /  DBili  x   /  AST  137<H>  /  ALT  121<H>  /  AlkPhos  79  10-11    PT/INR - ( 11 Oct 2018 00:35 )   PT: 13.20 sec;   INR: 1.22 ratio         PTT - ( 11 Oct 2018 00:35 )  PTT:38.0 sec  Troponin T, Serum: <0.01 ng/mL (10-11-18 @ 00:35)  Creatine Kinase, Serum: 443 U/L <H> (10-11-18 @ 00:35)    CARDIAC MARKERS ( 11 Oct 2018 00:35 )  x     / <0.01 ng/mL / 443 U/L / x     / x            Troponin trend:        Hemoglobin A1C   Thyroid      RADIOLOGY:  -CT:  < from: CT Head No Cont (10.11.18 @ 03:11) >      IMPRESSION:  No CT evidence of acute intracranial pathology    < end of copied text >    < from: CT Chest No Cont (10.11.18 @ 03:11) >      IMPRESSION:    Small right pneumothorax and pleural effusion with several right acute   rib fractures as above.    < end of copied text >      ECG:    TELEMETRY EVENTS:  none Patient is a 68y old  Male who presents with a chief complaint of cardiopulmonary arrest (11 Oct 2018 05:09)    HPI:  68 yr old male with PMH of chronic LBBB/ cardiomyopathy (diagnosed > 25 yrs ago, ? due to viral etiology), HTN, CAD s/p PCI of RCA 2 yrs ago (at [facility], cardiologist Dr. Kari Bustos) presented to [facility] last night with cardiac arrest. History was provided by pt's wife. Last night pt's wife heard a "thump" and found pt on the floor unresponsive and pulseless, CPR was initiated and EMS brought pt to [facility] ED. In the ED pt was in refractory Vfib. As per ED attending, after approximately 1 hr of resuscitation (including defibrillations) ROSC was achieved. Pt was intubated but was unresponsive. STEMI code was called around 12:43 am. After discussing with interventional cardiologist on call and ED attending, STEMI code was canceled. Pt was then transferred to [facility] for further management.     In the [place] ED, patient was found to have a R. pneumothorax and a chest tube was placed.     As per pt's wife, pt had cardiomyopathy with EF 33% for many years and was scheduled to get a PPM today at [facility]. At baseline, pt is functionally active and didnot c/o any exertional chest pain/ dyspnea recently.     ROS:  unable to obtain. pt is intubated.     PAST MEDICAL & SURGICAL HISTORY      FAMILY HISTORY:  FAMILY HISTORY:  non contributory.     SOCIAL HISTORY:  [-]smoker  [-]Alcohol  [-]Drug    ALLERGIES:  No Known Allergies      MEDICATIONS:  MEDICATIONS  (STANDING):  amiodarone Infusion 0.5 mG/Min (16.667 mL/Hr) IV Continuous <Continuous>  fentaNYL   Infusion 0.5 MICROgram(s)/kG/Hr (4.196 mL/Hr) IV Continuous <Continuous>  heparin  Injectable 5000 Unit(s) SubCutaneous every 8 hours  midazolam Infusion 0.02 mG/kG/Hr (1.678 mL/Hr) IV Continuous <Continuous>  norepinephrine Infusion 0.05 MICROgram(s)/kG/Min (7.867 mL/Hr) IV Continuous <Continuous>  pantoprazole   Suspension 40 milliGRAM(s) Enteral Tube daily  potassium chloride  20 mEq/100 mL IVPB 20 milliEquivalent(s) IV Intermittent every 2 hours    MEDICATIONS  (PRN):      HOME MEDICATIONS:  Home Medications:      VITALS:   T(F): 97.3 (10-11 @ 05:55), Max: 98.5 (10-11 @ 00:47)  HR: 56 (10-11 @ 07:00) (56 - 84)  BP: 99/74 (10-11 @ 06:45) (61/41 - 150/96)  BP(mean): 48 (10-11 @ 07:00) (48 - 92)  RR: 23 (10-11 @ 07:00) (0 - 24)  SpO2: 100% (10-11 @ 07:00) (97% - 100%)    I&O's Summary    10 Oct 2018 07:01  -  11 Oct 2018 07:00  --------------------------------------------------------  IN: 169.9 mL / OUT: 155 mL / NET: 14.9 mL        PHYSICAL EXAM:  GEN: intubated  HEENT: no pallor  NECK: Supple, no JVD  LUNGS: b/l air entry  CARDIOVASCULAR: S1/S2 regular, no murmurs or rubs  ABD: Soft, BS+  EXT: No Lower extremity edema/cyanosis  NEURO: intubated, sedated   SKIN: Intact    LABS:                        14.2   10.64 )-----------( 152      ( 11 Oct 2018 00:35 )             40.9     10-11    128<L>  |  83<L>  |  14  ----------------------------<  286<H>  3.0<L>   |  25  |  1.3    Ca    9.8      11 Oct 2018 00:35  Mg     2.3     10-11    TPro  6.1  /  Alb  3.9  /  TBili  0.3  /  DBili  x   /  AST  137<H>  /  ALT  121<H>  /  AlkPhos  79  10-11    PT/INR - ( 11 Oct 2018 00:35 )   PT: 13.20 sec;   INR: 1.22 ratio         PTT - ( 11 Oct 2018 00:35 )  PTT:38.0 sec  Troponin T, Serum: <0.01 ng/mL (10-11-18 @ 00:35)  Creatine Kinase, Serum: 443 U/L <H> (10-11-18 @ 00:35)    CARDIAC MARKERS ( 11 Oct 2018 00:35 )  x     / <0.01 ng/mL / 443 U/L / x     / x            Troponin trend:        Hemoglobin A1C   Thyroid      RADIOLOGY:  -CT:  < from: CT Head No Cont (10.11.18 @ 03:11) >      IMPRESSION:  No CT evidence of acute intracranial pathology    < end of copied text >    < from: CT Chest No Cont (10.11.18 @ 03:11) >      IMPRESSION:    Small right pneumothorax and pleural effusion with several right acute   rib fractures as above.    < end of copied text >      ECG:  SR, first degree AV block,  IVCD  TELEMETRY EVENTS:  none Patient is a 68y old  Male who presents with a chief complaint of cardiopulmonary arrest (11 Oct 2018 05:09)    HPI:  68 yr old male with PMH of chronic LBBB/ cardiomyopathy (diagnosed > 25 yrs ago, ? due to viral etiology), HTN, CAD s/p PCI of RCA 2 yrs ago (at [facility], cardiologist Dr. Kari Bustos) presented to [facility] last night with cardiac arrest. History was provided by pt's wife. Last night pt's wife heard a "thump" and found pt on the floor unresponsive and pulseless, CPR was initiated and EMS brought pt to [facility] ED. In the ED pt was in refractory Vfib. As per ED attending, after approximately 1 hr of resuscitation (including defibrillations) ROSC was achieved. Pt was intubated but was unresponsive. STEMI code was called around 12:43 am. After discussing with interventional cardiologist on call and ED attending, STEMI code was canceled. Pt was then transferred to [facility] for further management.     In the [place] ED, patient was found to have a R. pneumothorax and a chest tube was placed.     As per pt's wife, pt had cardiomyopathy with EF 33% for many years and was scheduled to get a PPM today at [facility]. At baseline, pt is functionally active and didnot c/o any exertional chest pain/ dyspnea recently.     ROS:  unable to obtain. pt is intubated.     PAST MEDICAL & SURGICAL HISTORY      FAMILY HISTORY:  FAMILY HISTORY:  non contributory.     SOCIAL HISTORY:  [-]smoker  [-]Alcohol  [-]Drug    ALLERGIES:  No Known Allergies      MEDICATIONS:  MEDICATIONS  (STANDING):  amiodarone Infusion 0.5 mG/Min (16.667 mL/Hr) IV Continuous <Continuous>  fentaNYL   Infusion 0.5 MICROgram(s)/kG/Hr (4.196 mL/Hr) IV Continuous <Continuous>  heparin  Injectable 5000 Unit(s) SubCutaneous every 8 hours  midazolam Infusion 0.02 mG/kG/Hr (1.678 mL/Hr) IV Continuous <Continuous>  norepinephrine Infusion 0.05 MICROgram(s)/kG/Min (7.867 mL/Hr) IV Continuous <Continuous>  pantoprazole   Suspension 40 milliGRAM(s) Enteral Tube daily  potassium chloride  20 mEq/100 mL IVPB 20 milliEquivalent(s) IV Intermittent every 2 hours    MEDICATIONS  (PRN):      HOME MEDICATIONS:  Home Medications:      VITALS:   T(F): 97.3 (10-11 @ 05:55), Max: 98.5 (10-11 @ 00:47)  HR: 56 (10-11 @ 07:00) (56 - 84)  BP: 99/74 (10-11 @ 06:45) (61/41 - 150/96)  BP(mean): 48 (10-11 @ 07:00) (48 - 92)  RR: 23 (10-11 @ 07:00) (0 - 24)  SpO2: 100% (10-11 @ 07:00) (97% - 100%)    I&O's Summary    10 Oct 2018 07:01  -  11 Oct 2018 07:00  --------------------------------------------------------  IN: 169.9 mL / OUT: 155 mL / NET: 14.9 mL        PHYSICAL EXAM:  GEN: intubated  HEENT: no pallor  NECK: Supple, no JVD  LUNGS: b/l air entry  CARDIOVASCULAR: S1/S2 regular, no murmurs or rubs  ABD: Soft, BS+  EXT: No Lower extremity edema/cyanosis  NEURO: intubated, sedated   SKIN: Intact    LABS:                        14.2   10.64 )-----------( 152      ( 11 Oct 2018 00:35 )             40.9     10-11    128<L>  |  83<L>  |  14  ----------------------------<  286<H>  3.0<L>   |  25  |  1.3    Ca    9.8      11 Oct 2018 00:35  Mg     2.3     10-11    TPro  6.1  /  Alb  3.9  /  TBili  0.3  /  DBili  x   /  AST  137<H>  /  ALT  121<H>  /  AlkPhos  79  10-11    PT/INR - ( 11 Oct 2018 00:35 )   PT: 13.20 sec;   INR: 1.22 ratio         PTT - ( 11 Oct 2018 00:35 )  PTT:38.0 sec  Troponin T, Serum: <0.01 ng/mL (10-11-18 @ 00:35)  Creatine Kinase, Serum: 443 U/L <H> (10-11-18 @ 00:35)    CARDIAC MARKERS ( 11 Oct 2018 00:35 )  x     / <0.01 ng/mL / 443 U/L / x     / x            Troponin trend:        Hemoglobin A1C   Thyroid      RADIOLOGY:  -CT:  < from: CT Head No Cont (10.11.18 @ 03:11) >      IMPRESSION:  No CT evidence of acute intracranial pathology    < end of copied text >    < from: CT Chest No Cont (10.11.18 @ 03:11) >          IMPRESSION:    Small right pneumothorax and pleural effusion with several right acute   rib fractures as above.    < end of copied text >      ECG:  SR, first degree AV block,  IVCD  TELEMETRY EVENTS:  none

## 2018-10-11 NOTE — H&P ADULT - NSHPPHYSICALEXAM_GEN_ALL_CORE
Weight (kg): 83.915 (10-11-18 @ 02:02)  T(F): 97.5 (10-11-18 @ 04:45), Max: 98.5 (10-11-18 @ 00:47)  HR: 78 (10-11-18 @ 04:45) (58 - 84)  BP: 85/48 (10-11-18 @ 04:45) (61/41 - 150/96)  RR: 24 (10-11-18 @ 04:45) (20 - 24)  SpO2: 100% (10-11-18 @ 04:45) (97% - 100%) Weight (kg): 83.915 (10-11-18 @ 02:02)  T(F): 97.5 (10-11-18 @ 04:45), Max: 98.5 (10-11-18 @ 00:47)  HR: 78 (10-11-18 @ 04:45) (58 - 84)  BP: 85/48 (10-11-18 @ 04:45) (61/41 - 150/96)  RR: 24 (10-11-18 @ 04:45) (20 - 24)  SpO2: 100% (10-11-18 @ 04:45) (97% - 100%)    Physical Exam:  General: Not in distress. Intubated and sedated  HEENT: PERRLA.  Cardio: Regular rate and rhythm, S1, S2, no murmur, rub, or gallop.  Pulm: Clear to auscultation bilaterally. No wheezing, rales, or rhonchi.  Abdomen: Soft, non-tender, non-distended. Normoactive bowel sounds.  Extremities: No cyanosis or edema bilaterally. No calf tenderness to palpation.  Neuro: A&O x3. No focal deficits. CN II - XII grossly intact. Weight (kg): 83.915 (10-11-18 @ 02:02)  T(F): 97.5 (10-11-18 @ 04:45), Max: 98.5 (10-11-18 @ 00:47)  HR: 78 (10-11-18 @ 04:45) (58 - 84)  BP: 85/48 (10-11-18 @ 04:45) (61/41 - 150/96)  RR: 24 (10-11-18 @ 04:45) (20 - 24)  SpO2: 100% (10-11-18 @ 04:45) (97% - 100%)    Physical Exam:  General: Not in distress. Intubated and sedated  Cardio: Regular rate and rhythm, S1, S2, no murmur, rub, or gallop.  Pulm: No wheezing, rales, or rhonchi.  Abdomen: Soft, non-tender, non-distended. Normoactive bowel sounds.  Extremities: No cyanosis or edema bilaterally. No calf tenderness to palpation.  Neuro: Intubated and sedated.

## 2018-10-11 NOTE — H&P ADULT - NSHPLABSRESULTS_GEN_ALL_CORE
CBC Full  -  ( 11 Oct 2018 00:35 )  WBC Count : 10.64 K/uL  Hemoglobin : 14.2 g/dL  Hematocrit : 40.9 %  Platelet Count - Automated : 152 K/uL  Mean Cell Volume : 94.2 fL  Mean Cell Hemoglobin : 32.7 pg  Mean Cell Hemoglobin Concentration : 34.7 g/dL    BMP: 10-11-18 @ 00:35  128 | 83 | 14   -----------------< 286  3.0  | 25 | 1.3  eGFR(AA): 64, eGFR (non-AA): 55  Ca 9.8, Mg 2.3, P --    LFTs: 10-11-18 @ 00:35  TP  6.1  | 3.9 Alb   ---------------  TB  0.3  | --  DB   ---------------   | 137 AST            ^          79  ALK    PT/INR/PTT: 10-11-18 @ 00:35  13.20 | 38.0        ^      1.22    Cardiac Enzymes: 10-11-18 @ 00:35  Trop T: <0.01  CK: 443<H>    ABG - ( 11 Oct 2018 03:57 )  pH, Arterial: 7.27  pH, Blood: x     /  pCO2: 55    /  pO2: 428   / HCO3: 25    / Base Excess: -2.2  /  SaO2: 100       CT Chest No Cont (10.11.18 @ 03:11):  Small right pneumothorax and pleural effusion with several right acute rib fractures as above.    CT Head No Cont (10.11.18 @ 03:11):  No CT evidence of acute intracranial pathology.

## 2018-10-11 NOTE — ED PROVIDER NOTE - MEDICAL DECISION MAKING DETAILS
68yM pmhx  viral cardiomyopathy - stent x 2  1 year ago on plavix   cardiologist  at [facility] -  BIBA ems  ain  cardiac arrest -   per wife - pt due for "PM" tomorrow - wife heard bang -  went upstairs  pt on  floor -  agonal breathing -   cpr  started  immediately  on EMS arrival   pt in VFIB -  defib x 6 epi x6,  nahco3,  Ca,  Mg given  -  pt  refractory  vfib -  intubated ,   vfib -    acls  continued  in ed -  shock x3  , double sequential  , lidocaine,  given -  pt with  ROSC    EKG with LBBB -  stemi code [facility] d- no cath lab at this time -  pt  after  1hour of  cpr   gcs 3 -  Pt being  sent to  ED [place] site - needs  CT head -   PE"  pupisl equal ,  cvs  rrr   IO in place  central line placed  /80  HR 57

## 2018-10-11 NOTE — CONSULT NOTE ADULT - SUBJECTIVE AND OBJECTIVE BOX
Patient is a 68y old  Male who presents with a chief complaint of cardiopulmonary arrest (11 Oct 2018 07:37)      HPI:  67 y/o M w/ PMH of LBBB, viral cardiomyopathy, CAD w/ 2 stents on plavix, presented to the [facility] site after being found unresponsive. Patient was at home with family, when the family members heard a "thump." They immediately ran over and found the patient unresponsive and pulseless. CPR was started immediately (one family member is a nurse). EMS arrived and took over CPR and intubated the patient in the field. Patient was in V-fib. Six epinephrine, 100mg lidocaine, and bicarbonate was given during the code. ED reports 2 shocks given. After approximately 1 hour of resuscitation, ROSC was achieved. Patient was transported to the [facility] site. STEMI code was called and patient was transported to the [place] site. At the [place] site, cardiology canceled the STEMI code, but the patient remained intubated and sedated. He is admitted to the ICU for further monitoring and management. On Levophed     In the [place] ED, patient was found to have a traumatic R. pneumohemothorax. CT surgery was consulted and placed a chest tube.     Of note, the patient was scheduled for pacemaker placement at [facility] today (10/11/18). Family states the pacemaker was being placed for a chronic left bundle branch block. (11 Oct 2018 05:09)      PAST MEDICAL & SURGICAL HISTORY:      SOCIAL HX:   Smoking       X smoker                   ETOH                            Other    FAMILY HISTORY:  :  No known cardiovacular family hisotry     ROS:  See HPI     Allergies    No Known Allergies    Intolerances          PHYSICAL EXAM    ICU Vital Signs Last 24 Hrs  T(C): 36.3 (11 Oct 2018 05:55), Max: 36.9 (11 Oct 2018 00:47)  T(F): 97.3 (11 Oct 2018 05:55), Max: 98.5 (11 Oct 2018 00:47)  HR: 56 (11 Oct 2018 07:00) (56 - 84)  BP: 99/74 (11 Oct 2018 06:45) (61/41 - 150/96)  BP(mean): 48 (11 Oct 2018 07:00) (48 - 92)  ABP: --  ABP(mean): --  RR: 23 (11 Oct 2018 07:00) (0 - 24)  SpO2: 100% (11 Oct 2018 07:00) (97% - 100%)      General: In NAD.  No response to pain   HEENT:  + ET              Lymphatic system: No cervical LN   Lungs: Bilateral BS  Cardiovascular: Regular  Gastrointestinal: Soft, Positive BS  Musculoskeletal: No clubbing.    Skin: Warm.  Intact  Neurological: Coughs to suction       10-10-18 @ 07:01  -  10-11-18 @ 07:00  --------------------------------------------------------  IN:    amiodarone Infusion: 133.2 mL    norepinephrine Infusion: 440.6 mL  Total IN: 573.8 mL    OUT:    Chest Tube: 120 mL    Voided: 35 mL  Total OUT: 155 mL    Total NET: 418.8 mL      10-11-18 @ 07:01  -  10-11-18 @ 08:29  --------------------------------------------------------  IN:  Total IN: 0 mL    OUT:    Chest Tube: 130 mL  Total OUT: 130 mL    Total NET: -130 mL          LABS:                          14.2   10.64 )-----------( 152      ( 11 Oct 2018 00:35 )             40.9                                               10-11    128<L>  |  83<L>  |  14  ----------------------------<  286<H>  3.0<L>   |  25  |  1.3    Ca    9.8      11 Oct 2018 00:35  Mg     2.3     10-11    TPro  6.1  /  Alb  3.9  /  TBili  0.3  /  DBili  x   /  AST  137<H>  /  ALT  121<H>  /  AlkPhos  79  10-11      PT/INR - ( 11 Oct 2018 00:35 )   PT: 13.20 sec;   INR: 1.22 ratio         PTT - ( 11 Oct 2018 00:35 )  PTT:38.0 sec                                           CARDIAC MARKERS ( 11 Oct 2018 00:35 )  x     / <0.01 ng/mL / 443 U/L / x     / x                                                LIVER FUNCTIONS - ( 11 Oct 2018 00:35 )  Alb: 3.9 g/dL / Pro: 6.1 g/dL / ALK PHOS: 79 U/L / ALT: 121 U/L / AST: 137 U/L / GGT: x                                                                                               Mode: AC/ CMV (Assist Control/ Continuous Mandatory Ventilation)  RR (machine): 24  TV (machine): 500  FiO2: 40  PEEP: 5  ITime: 1  MAP: 14  PIP: 30                                      ABG - ( 11 Oct 2018 07:13 )  pH, Arterial: 7.31  pH, Blood: x     /  pCO2: 44    /  pO2: 122   / HCO3: 22    / Base Excess: -4.2  /  SaO2: 99    lac 5.4              X-Rays      ET Chest tube in place.  OG OK.  no infiltrates                                                                              ECHO    MEDICATIONS  (STANDING):  amiodarone Infusion 0.5 mG/Min (16.667 mL/Hr) IV Continuous <Continuous>  fentaNYL   Infusion 0.5 MICROgram(s)/kG/Hr (4.196 mL/Hr) IV Continuous <Continuous>  heparin  Injectable 5000 Unit(s) SubCutaneous every 8 hours  midazolam Infusion 0.02 mG/kG/Hr (1.678 mL/Hr) IV Continuous <Continuous>  norepinephrine Infusion 0.05 MICROgram(s)/kG/Min (3.934 mL/Hr) IV Continuous <Continuous>  pantoprazole   Suspension 40 milliGRAM(s) Enteral Tube daily  potassium chloride  20 mEq/100 mL IVPB 20 milliEquivalent(s) IV Intermittent every 2 hours    MEDICATIONS  (PRN):

## 2018-10-12 NOTE — PROCEDURE NOTE - NSPOSTCAREGUIDE_GEN_A_CORE
Care for catheter as per unit/ICU protocols/Keep the cast/splint/dressing clean and dry
Keep the cast/splint/dressing clean and dry/Care for catheter as per unit/ICU protocols

## 2018-10-12 NOTE — PROGRESS NOTE ADULT - ASSESSMENT
69 y/o M w/ PMH of LBBB, viral cardiomyopathy, CAD w/ 2 stents on plavix, presented to the [facility] site after being found unresponsive    1.) Multiorgan system failure: cr 2.7 (stable), Trop 4.13 (trending down), K 5.5.    2.) Traumatic pneumohemothorax: secondary to chest compressions during CPR    - Chest tube in place    - CT surgery is following    4.) GI / DVT PPx: protonix / heparin    5.) Disposition:    - Full Code. Had a goals of care discussion with the family, they would like to continue to have everything done at this time. 69 y/o M w/ PMH of LBBB, viral cardiomyopathy, CAD w/ 2 stents on plavix, presented to the [facility] site after being found unresponsive    10/11: pt went into multiorgan system failure. Renal on board. [place] placed. Pt on CVVHD.    # multi-organ system failure:  - cw CVVHD  - trend CMP, CBC, CE, lactate    # Cardiac arrest - vfib - witnessed  - Wean levophed, then dopamine.  - [place]  - Cardiology following.  - ASA, plavix, Heparin trend ptt    # Acute hypoxemic respiratory failure  - trend ABG    # elevated white count  - pending source, f/u pan cultures  - abx: vancomycin, cefepime    # insulin sliding scale    # diet  - nutrition c/s  - KUB for OG    # DVT/GI ppx    full code

## 2018-10-12 NOTE — PROGRESS NOTE ADULT - SUBJECTIVE AND OBJECTIVE BOX
SUBJ:    Patient post cardiac arrest.  Intubated and on multiple pressors.  Bp is stable at this time.  Family feels he has some purposeful movements.     After conversation with his cardiologist at [facility] we noted that he was in fact scheduled to have BiV Aicd the day of his cardiac arrest.  He has a known hx of DCM for over 20 years with hx of stents to RCA that was not the cause of cardiomyopathy.      MEDICATIONS  (STANDING):  aspirin  chewable 81 milliGRAM(s) Oral daily  chlorhexidine 0.12% Liquid 15 milliLiter(s) Oral Mucosa two times a day  chlorhexidine 4% Liquid 1 Application(s) Topical <User Schedule>  clopidogrel Tablet 75 milliGRAM(s) Oral daily  DOPamine Infusion 2.5 MICROgram(s)/kG/Min (7.867 mL/Hr) IV Continuous <Continuous>  fentaNYL   Infusion. 0.5 MICROgram(s)/kG/Hr (4.196 mL/Hr) IV Continuous <Continuous>  heparin  Infusion 900 Unit(s)/Hr (9 mL/Hr) IV Continuous <Continuous>  norepinephrine Infusion 0.05 MICROgram(s)/kG/Min (3.934 mL/Hr) IV Continuous <Continuous>  norepinephrine Infusion 0.05 MICROgram(s)/kG/Min (7.867 mL/Hr) IV Continuous <Continuous>  pantoprazole   Suspension 40 milliGRAM(s) Enteral Tube daily  PureFlow Dialysate RFP-400 (K 2 / Ca 3) 5000 milliLiter(s) (2000 mL/Hr) CRRT <Continuous>  vasopressin Infusion 0.04 Unit(s)/Min (2.4 mL/Hr) IV Continuous <Continuous>    MEDICATIONS  (PRN):  heparin  Injectable 4000 Unit(s) IV Push every 6 hours PRN For aPTT less than 40            Vital Signs Last 24 Hrs  T(C): 34 (12 Oct 2018 08:00), Max: 38.1 (11 Oct 2018 20:00)  T(F): 93.2 (12 Oct 2018 08:00), Max: 100.5 (11 Oct 2018 20:00)  HR: 90 (12 Oct 2018 08:30) (52 - 114)  BP: 118/70 (11 Oct 2018 11:15) (70/33 - 118/70)  BP(mean): 73 (12 Oct 2018 00:45) (41 - 103)  RR: 27 (12 Oct 2018 08:30) (0 - 30)  SpO2: 93% (12 Oct 2018 07:45) (74% - 114%)     REVIEW OF SYSTEMS:  CONSTITUTIONAL: No fever, weight loss, or fatigue  CARDIOLOGY: PAtient denies chest pain, shortness of breath or syncopal episodes.   RESPIRATORY: denies shortness of breath, wheezeing.   NEUROLOGICAL: NO weakness, no focal deficits to report.  ENDOCRINOLOGICAL: no recent change in diabetic medications.   GI: no BRBPR, no N,V,diarrhea.    PSYCHIATRY: normal mood and affect  HEENT: no nasal discharge, no ecchymosis  SKIN: no ecchymosis, no breakdown  MUSCULOSKELETAL: Full range of motion x4.        PHYSICAL EXAM:  · CONSTITUTIONAL:	Well-developed, well nourished    BMI-  ·RESPIRATORY:   airway patent; breath sounds equal; good air movement; respirations non-labored; clear to auscultation bilaterally; no chest wall tenderness; no intercostal retractions; no rales,rhonchi or wheeze  · CARDIOVASCULAR	regular rate and rhythm  no rub  no murmur  normal PMI  · EXTREMITIES: No cyanosis, clubbing or edema  · VASCULAR: 	Equal and normal pulses (carotid, femoral, dorsalis pedis)  	  TELEMETRY:    ECG: NSR 60 LBBB known to be old.     TTE:  Echo with EF here 35% mild thickening and paradoxical motion abnormality cw LBBB    LABS:                        9.8    27.15 )-----------( 184      ( 12 Oct 2018 04:30 )             28.7     10-12    132<L>  |  93<L>  |  31<H>  ----------------------------<  128<H>  5.5<H>   |  15<L>  |  2.7<H>    Ca    8.4<L>      12 Oct 2018 04:30  Mg     1.9     10-12    TPro  5.6<L>  /  Alb  3.5  /  TBili  0.8  /  DBili  x   /  AST  3110<H>  /  ALT  2850<H>  /  AlkPhos  84  10-12    CARDIAC MARKERS ( 12 Oct 2018 04:30 )  x     / 4.13 ng/mL / 1723 U/L / x     / 45.1 ng/mL  CARDIAC MARKERS ( 11 Oct 2018 17:30 )  x     / 5.67 ng/mL / 1657 U/L / x     / 61.3 ng/mL  CARDIAC MARKERS ( 11 Oct 2018 16:45 )  x     / 5.34 ng/mL / 1585 U/L / x     / 64.4 ng/mL  CARDIAC MARKERS ( 11 Oct 2018 10:35 )  x     / 4.16 ng/mL / 1304 U/L / x     / 49.2 ng/mL  CARDIAC MARKERS ( 11 Oct 2018 00:35 )  x     / <0.01 ng/mL / 443 U/L / x     / x          PT/INR - ( 12 Oct 2018 04:30 )   PT: 20.40 sec;   INR: 1.90 ratio         PTT - ( 12 Oct 2018 04:30 )  PTT:168.4 sec    I&O's Summary    11 Oct 2018 07:01  -  12 Oct 2018 07:00  --------------------------------------------------------  IN: 2670.8 mL / OUT: 1426 mL / NET: 1244.8 mL    12 Oct 2018 07:01  -  12 Oct 2018 08:53  --------------------------------------------------------  IN: 87.4 mL / OUT: 92 mL / NET: -4.6 mL      BNPSerum Pro-Brain Natriuretic Peptide: 7501 pg/mL (10-11 @ 16:45)    RADIOLOGY & ADDITIONAL STUDIES:    IMPRESSION AND PLAN:    Patient Post cardiac arrest.     1. etiologies include ACS, DCM in generatl with VT/VF or even pause dependent VT VF with hx of bradycardia.  -continue asa plavix and heparin noted downward trend of trops.   -contnnue pressors for now seems to be maintaining bp and noted EF is at least as good as what was previously reported as outpt. Taper down as BP allows. WOuld leave dopa till last with hx fo natasha.   -renal fu and support.   -neuro fu and support    If the patient wakes up and becomes neurologically intact will perform cardiac cath to assess further.   for now medical and supportive treatment.   Disucuss antiarrythmics with EPS.

## 2018-10-12 NOTE — PROCEDURE NOTE - PROCEDURE
<<-----Click on this checkbox to enter Procedure Dialysis catheter inserted in groin  10/12/2018    Active  [id number]

## 2018-10-12 NOTE — PROCEDURE NOTE - NSINFORMCONSENT_GEN_A_CORE
Benefits, risks, and possible complications of procedure explained to patient/caregiver who verbalized understanding and gave written consent.
This was an emergent procedure.

## 2018-10-12 NOTE — PROGRESS NOTE ADULT - SUBJECTIVE AND OBJECTIVE BOX
Patient is a 68y old  Male who presents with a chief complaint of cardiopulmonary arrest (12 Oct 2018 06:51)        Over Night Events: On MV.  On Levophed, Dopamine, and Vaso.  Improved MS.  Now sedated.  On CVVHD         ROS:  See HPI    PHYSICAL EXAM    ICU Vital Signs Last 24 Hrs  T(C): 34 (12 Oct 2018 08:00), Max: 38.1 (11 Oct 2018 20:00)  T(F): 93.2 (12 Oct 2018 08:00), Max: 100.5 (11 Oct 2018 20:00)  HR: 90 (12 Oct 2018 08:30) (52 - 114)  BP: 118/70 (11 Oct 2018 11:15) (70/33 - 118/70)  BP(mean): 73 (12 Oct 2018 00:45) (41 - 103)  ABP: 86/74 (12 Oct 2018 08:30) (68/40 - 152/86)  ABP(mean): 78 (12 Oct 2018 08:30) (46 - 102)  RR: 27 (12 Oct 2018 08:30) (0 - 30)  SpO2: 93% (12 Oct 2018 07:45) (74% - 114%)      General: In NAD.  Sedated   HEENT: + ET              Lymphatic system: No cervical LN   Lungs: Bilateral BS  Cardiovascular: Regular   Gastrointestinal: Soft, Positive BS  Extremities: No clubbing.  Moves extremities.  Full Range of motion   Skin: Warm, intact  Neurological: No motor deficit       10-11-18 @ 07:01  -  10-12-18 @ 07:00  --------------------------------------------------------  IN:    amiodarone Infusion: 16.7 mL    DOPamine Infusion: 327.9 mL    fentaNYL Infusion.: 90.3 mL    heparin  Infusion.: 90 mL    heparin Infusion: 54 mL    Lactated Ringers IV Bolus: 500 mL    norepinephrine Infusion: 703.7 mL    norepinephrine Infusion: 852.2 mL    vasopressin Infusion: 36 mL  Total IN: 2670.8 mL    OUT:    Chest Tube: 340 mL    Indwelling Catheter - Urethral: 61 mL    Other: 1025 mL  Total OUT: 1426 mL    Total NET: 1244.8 mL          LABS:                            9.8    27.15 )-----------( 184      ( 12 Oct 2018 04:30 )             28.7                                               10-12    132<L>  |  93<L>  |  31<H>  ----------------------------<  128<H>  5.5<H>   |  15<L>  |  2.7<H>    Ca    8.4<L>      12 Oct 2018 04:30  Mg     1.9     10-12    TPro  5.6<L>  /  Alb  3.5  /  TBili  0.8  /  DBili  x   /  AST  3110<H>  /  ALT  2850<H>  /  AlkPhos  84  10-12      PT/INR - ( 12 Oct 2018 04:30 )   PT: 20.40 sec;   INR: 1.90 ratio         PTT - ( 12 Oct 2018 04:30 )  PTT:168.4 sec                                       Urinalysis Basic - ( 11 Oct 2018 09:33 )    Color: Straw / Appearance: Clear / SG: <=1.005 / pH: x  Gluc: x / Ketone: Negative  / Bili: Negative / Urobili: 0.2   Blood: x / Protein: 30 / Nitrite: Negative   Leuk Esterase: Small / RBC: x / WBC x   Sq Epi: x / Non Sq Epi: x / Bacteria: x        CARDIAC MARKERS ( 12 Oct 2018 04:30 )  x     / 4.13 ng/mL / 1723 U/L / x     / 45.1 ng/mL  CARDIAC MARKERS ( 11 Oct 2018 17:30 )  x     / 5.67 ng/mL / 1657 U/L / x     / 61.3 ng/mL  CARDIAC MARKERS ( 11 Oct 2018 16:45 )  x     / 5.34 ng/mL / 1585 U/L / x     / 64.4 ng/mL  CARDIAC MARKERS ( 11 Oct 2018 10:35 )  x     / 4.16 ng/mL / 1304 U/L / x     / 49.2 ng/mL  CARDIAC MARKERS ( 11 Oct 2018 00:35 )  x     / <0.01 ng/mL / 443 U/L / x     / x                                                LIVER FUNCTIONS - ( 12 Oct 2018 04:30 )  Alb: 3.5 g/dL / Pro: 5.6 g/dL / ALK PHOS: 84 U/L / ALT: 2850 U/L / AST: 3110 U/L / GGT: x                                                                                               Mode: AC/ CMV (Assist Control/ Continuous Mandatory Ventilation)  RR (machine): 28  TV (machine): 500  FiO2: 30  PEEP: 5  ITime: 1  MAP: 16  PIP: 33                                      ABG - ( 12 Oct 2018 07:18 )  pH, Arterial: 7.24  pH, Blood: x     /  pCO2: 40    /  pO2: 116   / HCO3: 17    / Base Excess: -10.0 /  SaO2: 98  . LAC 7.3              MEDICATIONS  (STANDING):  chlorhexidine 0.12% Liquid 15 milliLiter(s) Oral Mucosa two times a day  chlorhexidine 4% Liquid 1 Application(s) Topical <User Schedule>  DOPamine Infusion 2.5 MICROgram(s)/kG/Min (7.867 mL/Hr) IV Continuous <Continuous>  fentaNYL   Infusion. 0.5 MICROgram(s)/kG/Hr (4.196 mL/Hr) IV Continuous <Continuous>  heparin  Infusion 900 Unit(s)/Hr (9 mL/Hr) IV Continuous <Continuous>  norepinephrine Infusion 0.05 MICROgram(s)/kG/Min (3.934 mL/Hr) IV Continuous <Continuous>  norepinephrine Infusion 0.05 MICROgram(s)/kG/Min (7.867 mL/Hr) IV Continuous <Continuous>  pantoprazole   Suspension 40 milliGRAM(s) Enteral Tube daily  PureFlow Dialysate RFP-400 (K 2 / Ca 3) 5000 milliLiter(s) (2000 mL/Hr) CRRT <Continuous>  vasopressin Infusion 0.04 Unit(s)/Min (2.4 mL/Hr) IV Continuous <Continuous>    MEDICATIONS  (PRN):  heparin  Injectable 4000 Unit(s) IV Push every 6 hours PRN For aPTT less than 40      Xrays:        ET OK>  No infiltrates.  No Pneumothorax.  Chest tube OK                                                                              ECHO

## 2018-10-12 NOTE — PROGRESS NOTE ADULT - ASSESSMENT
68 yr old male with PMH of chronic LBBB/ cardiomyopathy (diagnosed > 25 yrs ago, ? due to viral etiology), HTN, CAD s/p PCI of RCA 2 yrs ago  Cardiac arrest - VFIB - witnessed  Acute respiratory failure  Pneumothorax s/p chest tube.   Now w/ Liver and kidney failure  HyperK in setting of above    will continue CVVH for clearance (not particularly vol overloaded at thsi time FIO2 only 30%)  2K, 2 L/hr replacement fluid keep Net even  Keep MAP > 65  Monitor UOP  monitor electrolytes phos on CVVH, replete as indicated   Prognosis is poor

## 2018-10-12 NOTE — PROCEDURE NOTE - NSINDICATIONS_GEN_A_CORE
arterial puncture to obtain ABG's/critical patient
dialysis/CRRT
venous access/critical illness/volume resuscitation
critical patient/monitoring purposes

## 2018-10-12 NOTE — CONSULT NOTE ADULT - REASON FOR ADMISSION
cardiopulmonary arrest

## 2018-10-12 NOTE — PROGRESS NOTE ADULT - ASSESSMENT
IMPRESSION:    Acute hypoxemic respiratory failure  Cardiac arrest - VFIB - witnessed   Traumatic pneumothorax s/p chest tube. (positive leak)  CAD NSTEMI.  MOF  FABIOLA on CVVHD    PLAN:    CNS: SAT.  Fentanyl if needed .  FU EEG.  Repat CTH     HEENT: Oral care, Chlorhexidine     PULMONARY:     CARDIOVASCULAR: Wean levophed. Dopamine,  [place] HD monitoring, FU cardiology      GI: GI prophylaxis. OG tube feeds.     RENAL:  Follow up lytes.  Correct as needed.  CVVHD I=O    INFECTIOUS DISEASE: Follow up cultures. Pan culx.  Start ABX.      HEMATOLOGICAL:  DVT prophylaxis. FU PTT     ENDOCRINE:  Follow up FS.  Insulin protocol if needed.    Full code     MICU monitoring     D/W family at the bedside. IMPRESSION:    Acute hypoxemic respiratory failure  Cardiac arrest - VFIB - witnessed   Traumatic pneumothorax s/p chest tube. (positive leak)  CAD NSTEMI.  MOF  FABIOLA on CVVHD    PLAN:    CNS: SAT.  Fentanyl if needed .  FU EEG.  Repat CTH     HEENT: Oral care, Chlorhexidine     PULMONARY:     CARDIOVASCULAR: Wean levophed. Dopamine,  [place] HD monitoring, FU cardiology      GI: GI prophylaxis. OG tube feeds.     RENAL:  Follow up lytes.  Correct as needed.  CVVHD I=O    INFECTIOUS DISEASE: Follow up cultures. Pan culx.  Start ABX.      HEMATOLOGICAL:  DVT prophylaxis. FU PTT     ENDOCRINE:  Follow up FS.  Insulin protocol if needed.    Full code     MICU monitoring     D/W family at the bedside.     DC femoral line.

## 2018-10-12 NOTE — PROCEDURE NOTE - NSSITEPREP_SKIN_A_CORE
chlorhexidine/Adherence to aseptic technique: hand hygiene prior to donning barriers (gown, gloves), don cap and mask, sterile drape over patient
chlorhexidine

## 2018-10-12 NOTE — PROCEDURE NOTE - NSPROCNAME_GEN_A_CORE
Arterial Puncture/Cannulation
Central Line Insertion
Central Line Insertion
Arterial Puncture/Cannulation

## 2018-10-12 NOTE — CONSULT NOTE ADULT - CONSULT REASON
ARF CPA
cardiac arrest
post cardiac arrest
s/p cardiac arrest
"multiorgan systems failure s/p cardiac arrest"

## 2018-10-12 NOTE — CONSULT NOTE ADULT - ASSESSMENT
IMP:  - cardiopulmonary arrest  - R pneumothorax  - shock, cardiogenic  - elevated liver enzymes  - FABIOLA on CVVH    SUGGEST:  - d/w medical residents several times today  - if able to reduce pressor doses over the next 24 h, and if no signs of ischemia, can attempt enteral feeding. Would start trophic feeds with Pivot 1.5, if pt remains afebrile.  - estimated needs are protein > 160 gm/d and kcal ? 2000/d by current PSE eqn.  - if unable to feed ADEQUATELY via enteral route, and if he cont to require these pressor doses, will need TPN. Remember that CVVC can markedly increase protein needs.

## 2018-10-12 NOTE — PROGRESS NOTE ADULT - SUBJECTIVE AND OBJECTIVE BOX
SUBJECTIVE:    Patient is a 68y old Male who presents with a chief complaint of cardiopulmonary arrest (11 Oct 2018 22:18)    69 y/o M w/ PMH of LBBB, viral cardiomyopathy, CAD w/ 2 stents on plavix, presented to the [facility] site after being found unresponsive. Patient was at home with family, when the family members heard a "thump." They immediately ran over and found the patient unresponsive and pulseless. CPR was started immediately (one family member is a nurse). EMS arrived and took over CPR and intubated the patient in the field. Patient was in V-fib. Six epinephrine, 100mg lidocaine, and bicarbonate was given during the code. ED reports 2 shocks given. After approximately 1 hour of resuscitation, ROSC was achieved. Patient was transported to the [facility]. STEMI code was called and patient was transported to the [place] site. At the [place] site, cardiology canceled the STEMI code, but the patient remained intubated and sedated. He is admitted to the ICU for further monitoring and management.     In the [place] ED, patient was found to have a traumatic R. pneumohemothorax. CT surgery was consulted and placed a chest tube.     Of note, the patient was scheduled for pacemaker placement at [facility] today (10/11/18). Family states the pacemaker was being placed for a chronic left bundle branch block.     Today is hospital day 2d. This morning he is resting comfortably in bed and reports no new issues or overnight events.     PAST MEDICAL & SURGICAL HISTORY    SOCIAL HISTORY:  Negative for smoking/alcohol/drug use.     ALLERGIES:  No Known Allergies    MEDICATIONS:  STANDING MEDICATIONS  chlorhexidine 0.12% Liquid 15 milliLiter(s) Oral Mucosa two times a day  chlorhexidine 4% Liquid 1 Application(s) Topical <User Schedule>  DOPamine Infusion 2.5 MICROgram(s)/kG/Min IV Continuous <Continuous>  fentaNYL   Infusion. 0.5 MICROgram(s)/kG/Hr IV Continuous <Continuous>  heparin  Infusion 900 Unit(s)/Hr IV Continuous <Continuous>  norepinephrine Infusion 0.05 MICROgram(s)/kG/Min IV Continuous <Continuous>  norepinephrine Infusion 0.05 MICROgram(s)/kG/Min IV Continuous <Continuous>  pantoprazole   Suspension 40 milliGRAM(s) Enteral Tube daily  PureFlow Dialysate RFP-400 (K 2 / Ca 3) 5000 milliLiter(s) CRRT <Continuous>  vasopressin Infusion 0.04 Unit(s)/Min IV Continuous <Continuous>    PRN MEDICATIONS  heparin  Injectable 4000 Unit(s) IV Push every 6 hours PRN    VITALS:   T(F): 96  HR: 86  BP: 118/70  RR: 27  SpO2: 92%    LABS:                        9.8    27.15 )-----------( 184      ( 12 Oct 2018 04:30 )             28.7     10-12    132<L>  |  93<L>  |  31<H>  ----------------------------<  128<H>  5.5<H>   |  15<L>  |  2.7<H>    Ca    8.4<L>      12 Oct 2018 04:30  Mg     1.9     10-12    TPro  5.6<L>  /  Alb  3.5  /  TBili  0.8  /  DBili  x   /  AST  x   /  ALT  x   /  AlkPhos  84  10-12    PT/INR - ( 12 Oct 2018 04:30 )   PT: 20.40 sec;   INR: 1.90 ratio         PTT - ( 12 Oct 2018 04:30 )  PTT:168.4 sec  Urinalysis Basic - ( 11 Oct 2018 09:33 )    Color: Straw / Appearance: Clear / SG: <=1.005 / pH: x  Gluc: x / Ketone: Negative  / Bili: Negative / Urobili: 0.2   Blood: x / Protein: 30 / Nitrite: Negative   Leuk Esterase: Small / RBC: x / WBC x   Sq Epi: x / Non Sq Epi: x / Bacteria: x      ABG - ( 11 Oct 2018 13:20 )  pH, Arterial: 7.36  pH, Blood: x     /  pCO2: 37    /  pO2: 82    / HCO3: 21    / Base Excess: -4.2  /  SaO2: 97                Creatine Kinase, Serum: 1723 U/L <H> (10-12-18 @ 04:30)  Troponin T, Serum: 4.13 ng/mL <HH> (10-12-18 @ 04:30)  Lactate, Blood: 6.3 mmol/L <HH> (10-12-18 @ 04:30)  Troponin T, Serum: 5.67 ng/mL <HH> (10-11-18 @ 17:30)  Creatine Kinase, Serum: 1657 U/L <H> (10-11-18 @ 17:30)  Myoglobin, Serum: 2907 ng/mL <H> (10-11-18 @ 16:45)  Troponin T, Serum: 5.34 ng/mL <HH> (10-11-18 @ 16:45)  Creatine Kinase, Serum: 1585 U/L <H> (10-11-18 @ 16:45)  Creatine Kinase, Serum: 1304 U/L <H> (10-11-18 @ 10:35)  Troponin T, Serum: 4.16 ng/mL <HH> (10-11-18 @ 10:35)  Lactate, Blood: 5.8 mmol/L <HH> (10-11-18 @ 10:35)      CARDIAC MARKERS ( 12 Oct 2018 04:30 )  x     / 4.13 ng/mL / 1723 U/L / x     / 45.1 ng/mL  CARDIAC MARKERS ( 11 Oct 2018 17:30 )  x     / 5.67 ng/mL / 1657 U/L / x     / 61.3 ng/mL  CARDIAC MARKERS ( 11 Oct 2018 16:45 )  x     / 5.34 ng/mL / 1585 U/L / x     / 64.4 ng/mL  CARDIAC MARKERS ( 11 Oct 2018 10:35 )  x     / 4.16 ng/mL / 1304 U/L / x     / 49.2 ng/mL  CARDIAC MARKERS ( 11 Oct 2018 00:35 )  x     / <0.01 ng/mL / 443 U/L / x     / x          RADIOLOGY:  < from: Xray Chest 1 View- PORTABLE-Urgent (10.11.18 @ 07:52) >  No radiographic evidence of acute cardiopulmonary disease.    < end of copied text >    PHYSICAL EXAM:  GEN: No acute distress  LUNGS: Clear to auscultation bilaterally, intubated  HEART: S1/S2 present. RRR.   ABD: Soft, non-tender, non-distended. Bowel sounds present  EXT: NC/NC/NE/2+PP/ALEXIS/Skin Intact.   NEURO: Opening eyes spontaneously and reacting to questions.    Intravenous access:   NG tube:  [place] cathter: Indwelling Urethral Catheter:     Connect To:  Leg Bag    Indication:  Urine Output Monitoring in Critically Ill (10-11-18 @ 01:33) (not performed) [active]  Indwelling Urethral Catheter:     Connect To:  Straight Drainage/Gravity    Indication:  Urine Output Monitoring in Critically Ill (10-11-18 @ 14:14) (not performed) [active]

## 2018-10-12 NOTE — PROCEDURE NOTE - NSPROCDETAILS_GEN_ALL_CORE
connected to a pressurized flush line/hemostasis with direct pressure, dressing applied/Seldinger technique/ultrasound guidance/positive blood return obtained via catheter/all materials/supplies accounted for at end of procedure/location identified, draped/prepped, sterile technique used, needle inserted/introduced/sutured in place
guidewire recovered/lumen(s) aspirated and flushed/sterile dressing applied/ultrasound guidance/sterile technique, catheter placed
lumen(s) aspirated and flushed/sterile dressing applied/ultrasound guidance/sterile technique, catheter placed/guidewire recovered
positive blood return obtained via catheter/sutured in place/location identified, draped/prepped, sterile technique used, needle inserted/introduced/ultrasound guidance

## 2018-10-12 NOTE — CONSULT NOTE ADULT - SUBJECTIVE AND OBJECTIVE BOX
67 y/o M w/ PMH of LBBB, viral cardiomyopathy, CAD w/ 2 stents on plavix, presented to the [facility] after being found unresponsive. Patient was at home with family, when the family members heard a "thump." They immediately ran over and found the patient unresponsive and pulseless. CPR was started immediately (one family member is a nurse). EMS arrived and took over CPR and intubated the patient in the field. Patient was in V-fib. Six epinephrine, 100mg lidocaine, and bicarbonate was given during the code. ED reports 2 shocks given. After approximately 1 hour of resuscitation, ROSC was achieved. Patient was transported to the [facility]. STEMI code was called and patient was transported to the [place] site. At the [place] site, cardiology canceled the STEMI code, but the patient remained intubated and sedated. He is admitted to the ICU for further monitoring and management.   In the [place] ED, patient was found to have a traumatic R. pneumohemothorax. CT surgery was consulted and placed a chest tube.   pt has developed elevated LFTs, progressively rising creat and K (with peaked T waves on EKG), and anuria.  CVVH started last night.  pt requiring 3 pressors.    EXAM:  T(C): 33.9 (12 Oct 2018 14:00), Max: 38.1 (11 Oct 2018 20:00)  T(F): 93 (12 Oct 2018 14:00), Max: 100.5 (11 Oct 2018 20:00)  HR: 72 (12 Oct 2018 15:15) (70 - 116)  BP(mean): 73 (12 Oct 2018 00:45) (73 - 103)  RR: 32 (12 Oct 2018 15:15) (0 - 32)  SpO2: 73% (12 Oct 2018 14:45) (55% - 114%)  Drug Dosing Weight  Height (cm): 182.88 (11 Oct 2018 05:55)  Weight (kg): 83.915 (11 Oct 2018 02:02)  BMI (kg/m2): 25.1 (11 Oct 2018 05:55)  BSA (m2): 2.06 (11 Oct 2018 05:55)  vent dependent  OG [place] to suction  + chest tube  abd soft, ? mildly distended  pt moving all ext, eyes open, reportedly tracking per RN (but not when I was there)  + L femoral [place], R femoral TLC, R radial A line  no edema  skin turgor good    MEDICATIONS  (STANDING):  aspirin  chewable 81 milliGRAM(s) Oral daily  cefepime   IVPB 1000 milliGRAM(s) IV Intermittent every 24 hours  chlorhexidine 0.12% Liquid 15 milliLiter(s) Oral Mucosa two times a day  chlorhexidine 4% Liquid 1 Application(s) Topical <User Schedule>  clopidogrel Tablet 75 milliGRAM(s) Oral daily  DOPamine Infusion 2.5 MICROgram(s)/kG/Min (7.867 mL/Hr) IV Continuous <Continuous>  fentaNYL   Infusion. 0.5 MICROgram(s)/kG/Hr (4.196 mL/Hr) IV Continuous <Continuous>  heparin  Infusion 900 Unit(s)/Hr (9 mL/Hr) IV Continuous <Continuous>  norepinephrine Infusion 0.05 MICROgram(s)/kG/Min (3.934 mL/Hr) IV Continuous <Continuous>  norepinephrine Infusion 0.05 MICROgram(s)/kG/Min (7.867 mL/Hr) IV Continuous <Continuous>  pantoprazole   Suspension 40 milliGRAM(s) Enteral Tube daily  PureFlow Dialysate RFP-400 (K 2 / Ca 3) 5000 milliLiter(s) (2000 mL/Hr) CRRT <Continuous>  sodium bicarbonate  Infusion 0.179 mEq/kG/Hr (100 mL/Hr) IV Continuous <Continuous>  sodium bicarbonate  Injectable 100 milliEquivalent(s) IV Push two times a day  vancomycin  IVPB 1000 milliGRAM(s) IV Intermittent once  vasopressin Infusion 0.04 Unit(s)/Min (2.4 mL/Hr) IV Continuous <Continuous>                        9.9    26.30 )-----------( 180      ( 12 Oct 2018 10:54 )             29.2   10-12    132<L>  |  93<L>  |  31<H>  ----------------------------<  128<H>  5.5<H>   |  15<L>  |  2.7<H>    Ca    8.4<L>      12 Oct 2018 04:30  Mg     1.9     10-12    TPro  5.6<L>  /  Alb  3.5  /  TBili  0.8  /  DBili  x   /  AST  3110<H>  /  ALT  2850<H>  /  AlkPhos  84  10-12

## 2018-10-12 NOTE — CHART NOTE - NSCHARTNOTEFT_GEN_A_CORE
PTT at 82. Kept same rate of heparin infusion for now for a PTT of 60-90  Follow up PTT in am PTT at 82. Decreased rate of heparin infusion to 900 from 1000 for a PTT of 50-70  Follow up PTT in am

## 2018-10-12 NOTE — PROGRESS NOTE ADULT - SUBJECTIVE AND OBJECTIVE BOX
Nephrology progress note    Patient was seen and examined, events over the last 24 h noted .  Tolerating CVVH  though K still elevated   LFT's rising     Allergies:  No Known Allergies    Hospital Medications:   MEDICATIONS  (STANDING):  aspirin  chewable 81 milliGRAM(s) Oral daily  chlorhexidine 0.12% Liquid 15 milliLiter(s) Oral Mucosa two times a day  chlorhexidine 4% Liquid 1 Application(s) Topical <User Schedule>  clopidogrel Tablet 75 milliGRAM(s) Oral daily  DOPamine Infusion 2.5 MICROgram(s)/kG/Min (7.867 mL/Hr) IV Continuous <Continuous>  fentaNYL   Infusion. 0.5 MICROgram(s)/kG/Hr (4.196 mL/Hr) IV Continuous <Continuous>  heparin  Infusion 900 Unit(s)/Hr (9 mL/Hr) IV Continuous <Continuous>  norepinephrine Infusion 0.05 MICROgram(s)/kG/Min (3.934 mL/Hr) IV Continuous <Continuous>  norepinephrine Infusion 0.05 MICROgram(s)/kG/Min (7.867 mL/Hr) IV Continuous <Continuous>  pantoprazole   Suspension 40 milliGRAM(s) Enteral Tube daily  PureFlow Dialysate RFP-400 (K 2 / Ca 3) 5000 milliLiter(s) (2000 mL/Hr) CRRT <Continuous>  vasopressin Infusion 0.04 Unit(s)/Min (2.4 mL/Hr) IV Continuous <Continuous>        VITALS:  T(F): 93.2 (10-12-18 @ 08:00), Max: 100.5 (10-11-18 @ 20:00)  HR: 90 (10-12-18 @ 09:15)  BP: 118/70 (10-11-18 @ 11:15)  RR: 26 (10-12-18 @ 09:15)  SpO2: 93% (10-12-18 @ 07:45)  Wt(kg): --    10-10 @ 07:01  -  10-11 @ 07:00  --------------------------------------------------------  IN: 573.8 mL / OUT: 155 mL / NET: 418.8 mL    10-11 @ 07:01  -  10-12 @ 07:00  --------------------------------------------------------  IN: 2670.8 mL / OUT: 1426 mL / NET: 1244.8 mL    10-12 @ 07:01  -  10-12 @ 10:30  --------------------------------------------------------  IN: 174.8 mL / OUT: 92 mL / NET: 82.8 mL          PHYSICAL EXAM:  General Appearance: intubated   Cardiovascular: Normal S1 S2, No JVD, No murmurs,   Respiratory: decrease bilateral airways entries   Gastrointestinal:  Soft, Non-tender  Extremities: no REJI   Lt Fem U dall (10/11)      LABS:  10-12    132<L>  |  93<L>  |  31<H>  ----------------------------<  128<H>  5.5<H>   |  15<L>  |  2.7<H>    Ca    8.4<L>      12 Oct 2018 04:30  Mg     1.9     10-12    TPro  5.6<L>  /  Alb  3.5  /  TBili  0.8  /  DBili      /  AST  3110<H>  /  ALT  2850<H>  /  AlkPhos  84  10-12                          9.8    27.15 )-----------( 184      ( 12 Oct 2018 04:30 )             28.7       Urine Studies:  Urinalysis Basic - ( 11 Oct 2018 09:33 )    Color: Straw / Appearance: Clear / SG: <=1.005 / pH:   Gluc:  / Ketone: Negative  / Bili: Negative / Urobili: 0.2   Blood:  / Protein: 30 / Nitrite: Negative   Leuk Esterase: Small / RBC:  / WBC    Sq Epi:  / Non Sq Epi:  / Bacteria:         RADIOLOGY & ADDITIONAL STUDIES:

## 2018-10-13 NOTE — CHART NOTE - NSCHARTNOTEFT_GEN_A_CORE
This morning, the patient's wife decided to sign a DNR for her . He is currently intubated post cardiac arrest and is unable to make decisions for himself   Moreover, she asked to not escalate care anymore. Specifically not to give more blood, not to administer any medications and not to renew the pressor drips once they are finished running. But not to withdraw the treatments that are still running   She understands that these medications are keeping the patient alive  She also discussed that with her sons which according to her agree with that decision   The step daughter of the patient is a nurse and also agrees with the decision   The DNR paper was signed by myself and  Sayed at 6 am

## 2018-10-13 NOTE — DISCHARGE NOTE FOR THE EXPIRED PATIENT - HOSPITAL COURSE
10/11 Pt presented to [facility] ED in cardiac arrest. Pt resuscitated and transferred to [place] for possible STEMI. Pt was admitted to ICU.  10/11 Pt went into multiorgan system failure and was placed on CVVHD for hemodynamic stability.  10/13 Pt was being supported by 4 pressure medications: phenylephrin, dopamine, norepinephrine, and vasopressin. Wife made decision on behalf of pt best interest to stop of resuscitative efforts. Pt  at 7AM. 10/11 Pt presented to [facility] ED in cardiac arrest. Pt resuscitated and transferred to [place] for possible STEMI. Pt was admitted to ICU.  10/11 Pt went into multiorgan system failure and was placed on CVVHD for hemodynamic stability.  10/13 Pt was being supported by 4 pressure medications, hepatic function was worsening, patient was not tolerating the CVVHD despite the 4 pressors: phenylephrin, dopamine, norepinephrine, and vasopressin. At 6 am the wife made decision on behalf of pt best interest to stop escalating resuscitative efforts. I.e to continue all drips and resuscitative efforts that are currently running but once they are finished not to administer new ones. Pt  at 7AM. 67 y/o M w/ PMH of LBBB, viral cardiomyopathy, CAD w/ 2 stents on plavix, presented to the [facility] after being found unresponsive. Patient was at home with family, when the family members heard a "thump." They immediately ran over and found the patient unresponsive and pulseless. CPR was started immediately (one family member is a nurse). EMS arrived and took over CPR and intubated the patient in the field. Patient was in V-fib. Six epinephrine, 100mg lidocaine, and bicarbonate was given during the code. ED reports 2 shocks given. After approximately 1 hour of resuscitation, ROSC was achieved. Patient was transported to the [facility]. STEMI code was called and patient was transported to the [facility]. At the [facility], cardiology canceled the STEMI code, but the patient remained intubated and sedated. He is admitted to the ICU for further monitoring and management.     In the [place] ED, patient was found to have a traumatic R. pneumohemothorax. CT surgery was consulted and placed a chest tube.     Of note, the patient was scheduled for pacemaker placement at [facility] today (10/11/18). Family states the pacemaker was being placed for a chronic left bundle branch block.      10/11 Pt presented to [facility] ED in cardiac arrest. Pt resuscitated and transferred to [place] for possible STEMI. Pt was admitted to ICU.  10/11 Pt went into multiorgan system failure and was placed on CVVHD for hemodynamic stability.  On the night of 10/12 to 10/13: Pt was being supported by 4 pressors (phenylephrin, dopamine, norepinephrine, and vasopressin), hepatic function was worsening, the patient was more acidotic and received 2 amps of bicarb and calcium gluconate as the patient was in tachycardia, was not tolerating the CVVHD despite the 4 pressors and 2 units of blood for volume resuscitation as hemoglobin dropped to 6.8 and was intermittently running whenever the BP would tolerate it. The family understood that the prognosis is poor and that at this point the options were limited. At 6 am the wife made decision on behalf of the patient to stop escalating resuscitative efforts. I.e to continue all drips and resuscitative efforts that are currently running but once they are finished not to administer new ones. Pt went to asystole and  at 7AM. 69 y/o M w/ PMH of LBBB, viral cardiomyopathy, CAD w/ 2 stents on plavix, presented to the [facility] after being found unresponsive. Patient was at home with family, when the family members heard a "thump." They immediately ran over and found the patient unresponsive and pulseless. CPR was started immediately (one family member is a nurse). EMS arrived and took over CPR and intubated the patient in the field. Patient was in V-fib. Six epinephrine, 100mg lidocaine, and bicarbonate was given during the code. ED reports 2 shocks given. After approximately 1 hour of resuscitation, ROSC was achieved. Patient was transported to the [facility]. STEMI code was called and patient was transported to the [facility]. At the [facility], cardiology canceled the STEMI code, but the patient remained intubated and sedated. He is admitted to the ICU for further monitoring and management.     In the [place] ED, patient was found to have a traumatic R. pneumohemothorax. CT surgery was consulted and placed a chest tube.     Of note, the patient was scheduled for pacemaker placement at [facility] today (10/11/18). Family states the pacemaker was being placed for a chronic left bundle branch block.      10/11 Pt presented to [facility] ED in cardiac arrest. Pt resuscitated and transferred to [place] for possible STEMI. Pt was admitted to ICU.  10/11 Pt went into multiorgan system failure and was placed on CVVHD for hemodynamic stability.  On the night of 10/12 to 10/13: Pt was being supported by 4 pressors (phenylephrin, dopamine, norepinephrine, and vasopressin), hepatic function was worsening, the patient was more acidotic and received 2 amps of bicarb and calcium gluconate as the patient was in tachycardia, was not tolerating the CVVHD despite the 4 pressors and 2 units of blood for volume resuscitation as hemoglobin dropped to 6.8 and was intermittently running whenever the BP would tolerate it. The family understood that the prognosis is poor and that at this point the options were limited. At 6 am the wife made decision on behalf of the patient to stop escalating resuscitative efforts and signed the DNR paper. I.e to continue all drips and resuscitative efforts that are currently running but once they are finished not to administer new ones. Pt went to asystole and  at 7AM.

## 2018-10-15 NOTE — ED PROCEDURE NOTE - ATTENDING CONTRIBUTION TO CARE
I was present for and supervised the key/critical aspects of the procedures performed during the care of the patient.
I personally evaluated the patient. I reviewed the Resident’s or Physician Assistant’s note (as assigned above), and agree with the findings and plan except as documented in my note.

## 2018-10-17 LAB
CULTURE RESULTS: SIGNIFICANT CHANGE UP
SPECIMEN SOURCE: SIGNIFICANT CHANGE UP

## 2018-10-24 DIAGNOSIS — S27.2XXA TRAUMATIC HEMOPNEUMOTHORAX, INITIAL ENCOUNTER: ICD-10-CM

## 2018-10-24 DIAGNOSIS — E87.5 HYPERKALEMIA: ICD-10-CM

## 2018-10-24 DIAGNOSIS — Z95.5 PRESENCE OF CORONARY ANGIOPLASTY IMPLANT AND GRAFT: ICD-10-CM

## 2018-10-24 DIAGNOSIS — Z87.891 PERSONAL HISTORY OF NICOTINE DEPENDENCE: ICD-10-CM

## 2018-10-24 DIAGNOSIS — R40.2112 COMA SCALE, EYES OPEN, NEVER, AT ARRIVAL TO EMERGENCY DEPARTMENT: ICD-10-CM

## 2018-10-24 DIAGNOSIS — I49.01 VENTRICULAR FIBRILLATION: ICD-10-CM

## 2018-10-24 DIAGNOSIS — I10 ESSENTIAL (PRIMARY) HYPERTENSION: ICD-10-CM
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
## 2018-10-24 DIAGNOSIS — B33.24 VIRAL CARDIOMYOPATHY: ICD-10-CM
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
## 2018-10-24 DIAGNOSIS — R40.2312 COMA SCALE, BEST MOTOR RESPONSE, NONE, AT ARRIVAL TO EMERGENCY DEPARTMENT: ICD-10-CM

## 2018-10-24 DIAGNOSIS — I44.0 ATRIOVENTRICULAR BLOCK, FIRST DEGREE: ICD-10-CM

## 2018-10-24 DIAGNOSIS — Z66 DO NOT RESUSCITATE: ICD-10-CM

## 2018-10-24 DIAGNOSIS — K72.00 ACUTE AND SUBACUTE HEPATIC FAILURE WITHOUT COMA: ICD-10-CM

## 2018-10-24 DIAGNOSIS — I25.10 ATHEROSCLEROTIC HEART DISEASE OF NATIVE CORONARY ARTERY WITHOUT ANGINA PECTORIS: ICD-10-CM

## 2018-10-24 DIAGNOSIS — I44.7 LEFT BUNDLE-BRANCH BLOCK, UNSPECIFIED: ICD-10-CM

## 2018-10-24 DIAGNOSIS — I46.2 CARDIAC ARREST DUE TO UNDERLYING CARDIAC CONDITION: ICD-10-CM

## 2018-10-24 DIAGNOSIS — N17.9 ACUTE KIDNEY FAILURE, UNSPECIFIED: ICD-10-CM

## 2018-10-24 DIAGNOSIS — J96.01 ACUTE RESPIRATORY FAILURE WITH HYPOXIA: ICD-10-CM

## 2018-10-24 DIAGNOSIS — R40.2212 COMA SCALE, BEST VERBAL RESPONSE, NONE, AT ARRIVAL TO EMERGENCY DEPARTMENT: ICD-10-CM
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
